# Patient Record
Sex: FEMALE | Race: WHITE | NOT HISPANIC OR LATINO | Employment: OTHER | URBAN - METROPOLITAN AREA
[De-identification: names, ages, dates, MRNs, and addresses within clinical notes are randomized per-mention and may not be internally consistent; named-entity substitution may affect disease eponyms.]

---

## 2018-05-08 ENCOUNTER — HOSPITAL ENCOUNTER (EMERGENCY)
Facility: HOSPITAL | Age: 63
Discharge: PSYCHIATRIC OR SUBSTANCE USE FACILITY | End: 2018-05-09
Attending: EMERGENCY MEDICINE
Payer: MEDICARE

## 2018-05-08 DIAGNOSIS — R06.02 SHORTNESS OF BREATH: Primary | ICD-10-CM

## 2018-05-08 LAB
ALBUMIN SERPL BCP-MCNC: 3.3 G/DL
ALLENS TEST: ABNORMAL
ALP SERPL-CCNC: 62 U/L
ALT SERPL W/O P-5'-P-CCNC: 7 U/L
ANION GAP SERPL CALC-SCNC: 6 MMOL/L
AST SERPL-CCNC: 12 U/L
BACTERIA #/AREA URNS HPF: NORMAL /HPF
BASOPHILS # BLD AUTO: 0.01 K/UL
BASOPHILS NFR BLD: 0.1 %
BILIRUB SERPL-MCNC: 0.4 MG/DL
BILIRUB UR QL STRIP: NEGATIVE
BUN SERPL-MCNC: 37 MG/DL
CALCIUM SERPL-MCNC: 9.2 MG/DL
CHLORIDE SERPL-SCNC: 110 MMOL/L
CLARITY UR: CLEAR
CO2 SERPL-SCNC: 25 MMOL/L
COLOR UR: YELLOW
CREAT SERPL-MCNC: 0.8 MG/DL
D DIMER PPP IA.FEU-MCNC: 1.96 MG/L FEU
DELSYS: ABNORMAL
DIFFERENTIAL METHOD: ABNORMAL
EOSINOPHIL # BLD AUTO: 0.3 K/UL
EOSINOPHIL NFR BLD: 3.4 %
ERYTHROCYTE [DISTWIDTH] IN BLOOD BY AUTOMATED COUNT: 15.3 %
EST. GFR  (AFRICAN AMERICAN): >60 ML/MIN/1.73 M^2
EST. GFR  (NON AFRICAN AMERICAN): >60 ML/MIN/1.73 M^2
GLUCOSE SERPL-MCNC: 76 MG/DL
GLUCOSE UR QL STRIP: NEGATIVE
HCO3 UR-SCNC: 21.9 MMOL/L (ref 24–28)
HCT VFR BLD AUTO: 33.9 %
HGB BLD-MCNC: 10.3 G/DL
HGB UR QL STRIP: NEGATIVE
KETONES UR QL STRIP: NEGATIVE
LACTATE SERPL-SCNC: 1 MMOL/L
LEUKOCYTE ESTERASE UR QL STRIP: ABNORMAL
LYMPHOCYTES # BLD AUTO: 2.5 K/UL
LYMPHOCYTES NFR BLD: 31.7 %
MCH RBC QN AUTO: 27.3 PG
MCHC RBC AUTO-ENTMCNC: 30.4 G/DL
MCV RBC AUTO: 90 FL
MICROSCOPIC COMMENT: NORMAL
MODE: ABNORMAL
MONOCYTES # BLD AUTO: 0.9 K/UL
MONOCYTES NFR BLD: 11.1 %
NEUTROPHILS # BLD AUTO: 4.3 K/UL
NEUTROPHILS NFR BLD: 53.7 %
NITRITE UR QL STRIP: NEGATIVE
PCO2 BLDA: 35.3 MMHG (ref 35–45)
PH SMN: 7.4 [PH] (ref 7.35–7.45)
PH UR STRIP: 7 [PH] (ref 5–8)
PLATELET # BLD AUTO: 185 K/UL
PMV BLD AUTO: 10.2 FL
PO2 BLDA: 71 MMHG (ref 80–100)
POC BE: -3 MMOL/L
POC SATURATED O2: 94 % (ref 95–100)
POC TCO2: 23 MMOL/L (ref 23–27)
POTASSIUM SERPL-SCNC: 4.3 MMOL/L
PROT SERPL-MCNC: 6.8 G/DL
PROT UR QL STRIP: NEGATIVE
RBC # BLD AUTO: 3.77 M/UL
RBC #/AREA URNS HPF: 1 /HPF (ref 0–4)
SAMPLE: ABNORMAL
SITE: ABNORMAL
SODIUM SERPL-SCNC: 141 MMOL/L
SP GR UR STRIP: <=1.005 (ref 1–1.03)
SQUAMOUS #/AREA URNS HPF: 2 /HPF
TROPONIN I SERPL DL<=0.01 NG/ML-MCNC: <0.006 NG/ML
TSH SERPL DL<=0.005 MIU/L-ACNC: 1.78 UIU/ML
URN SPEC COLLECT METH UR: ABNORMAL
UROBILINOGEN UR STRIP-ACNC: NEGATIVE EU/DL
WBC # BLD AUTO: 8.01 K/UL
WBC #/AREA URNS HPF: 4 /HPF (ref 0–5)

## 2018-05-08 PROCEDURE — 99285 EMERGENCY DEPT VISIT HI MDM: CPT | Mod: 25

## 2018-05-08 PROCEDURE — 85025 COMPLETE CBC W/AUTO DIFF WBC: CPT

## 2018-05-08 PROCEDURE — 84443 ASSAY THYROID STIM HORMONE: CPT

## 2018-05-08 PROCEDURE — 81000 URINALYSIS NONAUTO W/SCOPE: CPT

## 2018-05-08 PROCEDURE — 36600 WITHDRAWAL OF ARTERIAL BLOOD: CPT

## 2018-05-08 PROCEDURE — 96360 HYDRATION IV INFUSION INIT: CPT | Mod: 59

## 2018-05-08 PROCEDURE — 93005 ELECTROCARDIOGRAM TRACING: CPT

## 2018-05-08 PROCEDURE — 84484 ASSAY OF TROPONIN QUANT: CPT

## 2018-05-08 PROCEDURE — 83605 ASSAY OF LACTIC ACID: CPT

## 2018-05-08 PROCEDURE — 82803 BLOOD GASES ANY COMBINATION: CPT

## 2018-05-08 PROCEDURE — 85379 FIBRIN DEGRADATION QUANT: CPT

## 2018-05-08 PROCEDURE — 93010 ELECTROCARDIOGRAM REPORT: CPT | Mod: ,,, | Performed by: INTERNAL MEDICINE

## 2018-05-08 PROCEDURE — 80053 COMPREHEN METABOLIC PANEL: CPT

## 2018-05-08 PROCEDURE — 96361 HYDRATE IV INFUSION ADD-ON: CPT

## 2018-05-08 PROCEDURE — 25000003 PHARM REV CODE 250: Performed by: EMERGENCY MEDICINE

## 2018-05-08 RX ORDER — SULFAMETHOXAZOLE AND TRIMETHOPRIM 800; 160 MG/1; MG/1
1 TABLET ORAL
Status: ON HOLD | COMMUNITY
End: 2020-04-11 | Stop reason: HOSPADM

## 2018-05-08 RX ORDER — FERROUS SULFATE 325(65) MG
325 TABLET, DELAYED RELEASE (ENTERIC COATED) ORAL DAILY
Status: ON HOLD | COMMUNITY
End: 2020-04-11 | Stop reason: SDUPTHER

## 2018-05-08 RX ORDER — ALBUTEROL SULFATE 2.5 MG/.5ML
2.5 SOLUTION RESPIRATORY (INHALATION) 4 TIMES DAILY
COMMUNITY

## 2018-05-08 RX ORDER — ESCITALOPRAM OXALATE 20 MG/1
20 TABLET ORAL DAILY
Status: ON HOLD | COMMUNITY
End: 2020-04-11 | Stop reason: SDUPTHER

## 2018-05-08 RX ORDER — BUDESONIDE 0.5 MG/2ML
0.5 INHALANT ORAL 2 TIMES DAILY
Status: ON HOLD | COMMUNITY
End: 2020-04-11 | Stop reason: HOSPADM

## 2018-05-08 RX ORDER — TOLTERODINE 2 MG/1
4 CAPSULE, EXTENDED RELEASE ORAL DAILY
Status: ON HOLD | COMMUNITY
End: 2020-04-11 | Stop reason: HOSPADM

## 2018-05-08 RX ORDER — OXYBUTYNIN CHLORIDE 10 MG/1
10 TABLET, EXTENDED RELEASE ORAL DAILY
Status: ON HOLD | COMMUNITY
End: 2020-04-11 | Stop reason: SDUPTHER

## 2018-05-08 RX ORDER — FLUTICASONE PROPIONATE AND SALMETEROL 250; 50 UG/1; UG/1
1 POWDER RESPIRATORY (INHALATION) 2 TIMES DAILY
Status: ON HOLD | COMMUNITY
End: 2020-04-11 | Stop reason: SDUPTHER

## 2018-05-08 RX ADMIN — SODIUM CHLORIDE 1000 ML: 0.9 INJECTION, SOLUTION INTRAVENOUS at 10:05

## 2018-05-09 VITALS
OXYGEN SATURATION: 95 % | RESPIRATION RATE: 16 BRPM | HEIGHT: 61 IN | WEIGHT: 140 LBS | DIASTOLIC BLOOD PRESSURE: 85 MMHG | TEMPERATURE: 99 F | BODY MASS INDEX: 26.43 KG/M2 | SYSTOLIC BLOOD PRESSURE: 103 MMHG | HEART RATE: 64 BPM

## 2018-05-09 PROCEDURE — 25500020 PHARM REV CODE 255: Performed by: EMERGENCY MEDICINE

## 2018-05-09 RX ADMIN — IOHEXOL 100 ML: 350 INJECTION, SOLUTION INTRAVENOUS at 12:05

## 2018-05-09 NOTE — ED NOTES
Assumed care of a 62 year old female complain of Sob from local senior care facility . Alert and orient to person and place .. Placed in room 14 placed on cardiac monitor .02 at 2 l/m bnc . o2 sats 95  % ..hx od dementia .. Has a colostomy bag in LLQ abdomen with formed brown stool noted ,patient partially pulled bag off .ems taped it in place , wafer bag removed ,cleaned skin care given . New bag applied

## 2018-05-09 NOTE — ED NOTES
Patient holding on to leads ,pulled them off her chest. Iv cath intract sitting in the bed . Bed saturated . Bed linen changed Pt provided with gown . Diaper changed . returned to monitor . ,

## 2018-05-09 NOTE — ED NOTES
Pt cleared for discharge back to [facility] and charge nurse ordered transportation per SPD; pt is daipered and dry and intact; pt has mittens and was only on left hand on rounding; iv fluids almost completed

## 2018-05-09 NOTE — ED NOTES
SPD at the bedside for discharge transport. --- pt only had a gray sweater as clothing; no other valuables

## 2018-05-09 NOTE — ED PROVIDER NOTES
Encounter Date: 5/8/2018    SCRIBE #1 NOTE: I, Mayuri Morley, am scribing for, and in the presence of,  Dr. Pino Giles. I have scribed the entire note.       History     Chief Complaint   Patient presents with    Shortness of Breath     62y F to ED via [facility] from [facility]. c/o SOB, hypotensive. EMS reports O2 sats 91% on RA, BP 85/40 on arrival. 250cc NS given in route     This is a 62 y.o. female who has PMHx including COPD, CHF, UTI, dementia.  Hx is limited secondary to dementia.  The patient presents to the Emergency Department via EMS from [facility] for shortness of breath, hypotension.   EMS called for pt with shortness of breath, noted pt to be hypotensive to 85/40 on pickup, somewhat improved with IV fluids en route.  Pt's O2sat 91% on room air on arrival, up to 96% en route on 2 L NC O2 supplementation.  Symptoms are associated with productive cough.  Pt denies pain with urination.   Pt recently treated for UTI at [facility].  EMS report O2 sats at 91%      The history is provided by the EMS personnel.     Review of patient's allergies indicates:   Allergen Reactions    Ibuprofen     Penicillins      Past Medical History:   Diagnosis Date    Anemia     CHF (congestive heart failure)     COPD (chronic obstructive pulmonary disease)     Dementia     Diabetes mellitus     Hypertension     UTI (urinary tract infection)      Past Surgical History:   Procedure Laterality Date    COLOSTOMY       History reviewed. No pertinent family history.  Social History   Substance Use Topics    Smoking status: Unknown If Ever Smoked    Smokeless tobacco: Not on file    Alcohol use No     Review of Systems   Unable to perform ROS: Dementia   Respiratory: Positive for cough and shortness of breath.    Cardiovascular: Negative for chest pain.   Gastrointestinal: Negative for vomiting.   Genitourinary: Negative for difficulty urinating and dysuria.   Skin: Negative for wound.        Physical Exam     Initial Vitals [05/08/18 2135]   BP Pulse Resp Temp SpO2   (!) 122/91 99 (!) 22 98.3 °F (36.8 °C) 97 %      MAP       101.33         Physical Exam    Nursing note and vitals reviewed.  Constitutional: She appears well-developed and well-nourished. No distress.   HENT:   Head: Normocephalic and atraumatic.   Dry oropharynx.    Eyes: Conjunctivae are normal. Pupils are equal, round, and reactive to light.   Neck: Normal range of motion. Neck supple.   Cardiovascular: Normal rate, regular rhythm and normal heart sounds.   Pulmonary/Chest:   Tachypnea.  Faint wheezing bilaterally.  Positive wet cough.   Abdominal: Soft. She exhibits no distension. There is no tenderness.   Midline abdominal scar.  Ostomy bag with brown stool.   Musculoskeletal: Normal range of motion. She exhibits no edema or tenderness.   Lymphadenopathy:     She has no cervical adenopathy.   Neurological: She is alert and oriented to person, place, and time.   Scant pitting edema, bruising to the lower extremities.  Small abrasion to the left knee.  Open wound to the right leg, lateral aspect, 1x3 cm with reactive skin change/surrounding erythema.   Skin: Skin is warm and dry.         ED Course   Procedures  Labs Reviewed   CBC W/ AUTO DIFFERENTIAL - Abnormal; Notable for the following:        Result Value    RBC 3.77 (*)     Hemoglobin 10.3 (*)     Hematocrit 33.9 (*)     MCHC 30.4 (*)     RDW 15.3 (*)     All other components within normal limits   COMPREHENSIVE METABOLIC PANEL - Abnormal; Notable for the following:     BUN, Bld 37 (*)     Albumin 3.3 (*)     ALT 7 (*)     Anion Gap 6 (*)     All other components within normal limits   D DIMER, QUANTITATIVE - Abnormal; Notable for the following:     D-Dimer 1.96 (*)     All other components within normal limits   URINALYSIS - Abnormal; Notable for the following:     Specific Gravity, UA <=1.005 (*)     Leukocytes, UA 1+ (*)     All other components within normal limits    ISTAT PROCEDURE - Abnormal; Notable for the following:     POC PO2 71 (*)     POC HCO3 21.9 (*)     POC SATURATED O2 94 (*)     All other components within normal limits   LACTIC ACID, PLASMA   TROPONIN I   TSH   URINALYSIS MICROSCOPIC   B-TYPE NATRIURETIC PEPTIDE     EKG Readings: (Independently Interpreted)   EKG: sinus rhythm with PAC's at 86 bpm, nl axis, nl intervals, no hypertrophy, no ST-T changes as read by me (Dr. Giles).      Imaging Results          CTA Chest Non-Coronary (PE Study) (Final result)  Result time 05/09/18 00:36:46    Final result by Adonis Garcia MD (05/09/18 00:36:46)                 Impression:      1. No pulmonary thromboembolus up to the level of the proximal segmental pulmonary arteries.  2. CT findings consistent with pulmonary hypertension.  3. Mild upper lobe predominant centrilobular emphysema.  4. Subsegmental band like opacities within both lung apices, presumably scarring.  Consider follow-up evaluation with outpatient routine CT thorax for a more accurate characterization.  5. Sequela of previous granulomatous disease.  6. Compression fractures involving the T5, T6, T8 and T12 vertebral bodies of uncertain chronicity noting lack of previous study is available for comparison.  No significant osseous retropulsion.  7. Right thyroid nodule.  Recommend further characterization with outpatient ultrasound.      Electronically signed by: Adonis Garcia MD  Date:    05/09/2018  Time:    00:36             Narrative:    EXAMINATION:  CTA CHEST NON CORONARY    CLINICAL HISTORY:  elevated dimer, short of breath, low oxygen sat;    TECHNIQUE:  Low dose axial images, sagittal and coronal reformations were obtained from the thoracic inlet to the lung bases following the IV administration of 100 mL of Omnipaque 350.  Contrast timing was optimized to evaluate the pulmonary arteries.  MIP images were performed.    COMPARISON:  Radiograph 05/08/2018.    FINDINGS:  There is a 1.3 cm complex  right thyroid nodule with a possible enhancing component.    There is a left-sided aortic arch with 3 branch vessels.  The thoracic aorta tapers normally with mild atherosclerotic calcification.  No aneurysmal dilatation or dissection.  There is moderate atherosclerotic calcification at the origin of the left subclavian artery without hemodynamically significant stenosis.    There is marked dilatation of the main pulmonary artery.  No filling defects identified to suggest a thromboembolus up to the level of the proximal segmental arteries..    Heart is normal in size.  There is mild coronary artery atherosclerosis.  There is no associated right ventricular dilatation or hypertrophy.  No reflux of contrast into the hepatic veins.  No pericardial effusion.    There are calcified hilar lymph nodes.  No definite axillary or mediastinal lymph node enlargement.    Esophagus is normal in caliber and course.    The trachea and proximal airways are patent.  There is mild centrilobular emphysema with upper lobe predominance.  Subsegmental band like opacities are noted within both upper lobes, presumably scarring.  Calcified granuloma noted within the left lower lobe.  No focal airspace opacity.  No pneumothorax or pleural effusions.  No bronchiectasis.    Calcified splenic granulomas are identified.  Remaining visualized upper abdominal structures are unremarkable.    Visualized subcutaneous soft tissues are within normal limits.    There are compression fractures of the T5, T6, T8 and T12 vertebral bodies of uncertain chronicity degenerative changes of the spine are identified.  There is a sclerotic lesion within the posterior aspect of the left clavicle about the sternoclavicular joint, possibly a bone island but overall nonspecific.                               X-Ray Chest 1 View (Final result)  Result time 05/08/18 22:29:24    Final result by Robb Escamilla MD (05/08/18 22:29:24)                 Impression:       Stable cardiomegaly.  Chronic increased interstitial markings, similar to prior.  No acute findings.    Old left-sided rib fractures.  Old left clavicle fracture.      Electronically signed by: Robb Escamilla MD  Date:    05/08/2018  Time:    22:29             Narrative:    EXAMINATION:  XR CHEST 1 VIEW    CLINICAL HISTORY:  Shortness of breath    TECHNIQUE:  Single frontal view of the chest was performed.    COMPARISON:  August 6, 2014.    FINDINGS:  Old left-sided rib fractures.  Old left clavicle fracture.    Stable cardiomegaly.  Chronic increased interstitial markings, similar to prior.    Heart and lungs  appear unchanged when allowing for differences in technique and positioning.                                    Medical Decision Making:   Initial Assessment:   62 year old female with history of dementia, COPD, CHF presents from [facility] via EMS for shortness of breath.  On exam patient is positive for wheezing, tachypnea, wet cough.  Differential Diagnosis:   CHF, COPD, PE, PNA, pleural effusion.  Clinical Tests:   Lab Tests: Ordered and Reviewed  Radiological Study: Ordered and Reviewed  Medical Tests: Ordered and Reviewed  ED Management:  Plan for IV fluids, basic labs, UA, BNP, D-dimer, troponin, CXR, reassess.    11:40 PM  CXR reveals cardiomegaly, increased interstitial marking bilaterally, chronic stable changes, no acute pulmonary disease.    12:55 AM  Pt is resting comfortably at 100%, will take off supplemental O2 to get sats on room air. CTA is negative for acute pathology.    1:05 AM   Sats are still normal at 97-99% on room air. Pt is resting comfortably in the ER. Workup is unremarkable including CTA chest. Unclear etiology for presentation, however no indication to admit at this time. Will have the pt follow up with PCP within 2-3 days, return to the ED for any other concerns.      Clinical impression and plan discussed with patient.    Pt is to call for follow up with  PCP in 7 days.  Pt to return to the ED for any new or concerning symptoms.  Aftercare instructions and return precautions provided to patient.   Pt expressed understanding and agrees with plan.                           Clinical Impression:     1. Shortness of breath         Disposition:   Disposition: Discharged       Scribe attestation: I, Dr. Pino Giles, personally performed the services described in this documentation.   All medical record entries made by the scribe were at my direction and in my presence.   I have reviewed the chart and agree that the record is accurate and complete.   Pino Giles MD.                 Pino Giles MD  05/09/18 9545

## 2018-05-09 NOTE — ED NOTES
Pt repositioned and iv fluids in progress; rails up and bed in low and locked and has fall socks and bracelet on; pt in view of staff; left abd colostomy patent w/ brown stool

## 2018-05-09 NOTE — ED NOTES
Pt repeatedly  ask for coffee, dr Pores notified . Patient notified not at this time . Iv fluids infusing well   Pulling on cords . Wet sounding cough .pt denies SOB  Does have an audible wheeze but clears with coughing .

## 2020-04-05 ENCOUNTER — HOSPITAL ENCOUNTER (INPATIENT)
Facility: HOSPITAL | Age: 65
LOS: 5 days | DRG: 178 | End: 2020-04-11
Attending: EMERGENCY MEDICINE | Admitting: EMERGENCY MEDICINE
Payer: MEDICARE

## 2020-04-05 DIAGNOSIS — J44.9 CHRONIC OBSTRUCTIVE PULMONARY DISEASE, UNSPECIFIED COPD TYPE: ICD-10-CM

## 2020-04-05 DIAGNOSIS — R09.02 HYPOXIA: ICD-10-CM

## 2020-04-05 DIAGNOSIS — J18.9 MULTIFOCAL PNEUMONIA: ICD-10-CM

## 2020-04-05 DIAGNOSIS — R06.02 SHORTNESS OF BREATH: ICD-10-CM

## 2020-04-05 DIAGNOSIS — U07.1 COVID-19: Primary | ICD-10-CM

## 2020-04-05 LAB
ALLENS TEST: ABNORMAL
BACTERIA #/AREA URNS HPF: ABNORMAL /HPF
BASOPHILS # BLD AUTO: 0.01 K/UL (ref 0–0.2)
BASOPHILS NFR BLD: 0.2 % (ref 0–1.9)
BILIRUB UR QL STRIP: NEGATIVE
CLARITY UR: ABNORMAL
COLOR UR: ABNORMAL
DELSYS: ABNORMAL
DIFFERENTIAL METHOD: ABNORMAL
EOSINOPHIL # BLD AUTO: 0 K/UL (ref 0–0.5)
EOSINOPHIL NFR BLD: 0.2 % (ref 0–8)
ERYTHROCYTE [DISTWIDTH] IN BLOOD BY AUTOMATED COUNT: 14.4 % (ref 11.5–14.5)
ERYTHROCYTE [SEDIMENTATION RATE] IN BLOOD BY WESTERGREN METHOD: 16 MM/H
FLOW: 4
GLUCOSE UR QL STRIP: NEGATIVE
HCO3 UR-SCNC: 27.9 MMOL/L (ref 24–28)
HCT VFR BLD AUTO: 39.5 % (ref 37–48.5)
HGB BLD-MCNC: 11.9 G/DL (ref 12–16)
HGB UR QL STRIP: ABNORMAL
HYALINE CASTS #/AREA URNS LPF: 0 /LPF
IMM GRANULOCYTES # BLD AUTO: 0.01 K/UL (ref 0–0.04)
IMM GRANULOCYTES NFR BLD AUTO: 0.2 % (ref 0–0.5)
KETONES UR QL STRIP: NEGATIVE
LEUKOCYTE ESTERASE UR QL STRIP: ABNORMAL
LYMPHOCYTES # BLD AUTO: 1.5 K/UL (ref 1–4.8)
LYMPHOCYTES NFR BLD: 36.3 % (ref 18–48)
MCH RBC QN AUTO: 26.4 PG (ref 27–31)
MCHC RBC AUTO-ENTMCNC: 30.1 G/DL (ref 32–36)
MCV RBC AUTO: 88 FL (ref 82–98)
MICROSCOPIC COMMENT: ABNORMAL
MODE: ABNORMAL
MONOCYTES # BLD AUTO: 0.4 K/UL (ref 0.3–1)
MONOCYTES NFR BLD: 10.4 % (ref 4–15)
NEUTROPHILS # BLD AUTO: 2.2 K/UL (ref 1.8–7.7)
NEUTROPHILS NFR BLD: 52.7 % (ref 38–73)
NITRITE UR QL STRIP: NEGATIVE
NRBC BLD-RTO: 0 /100 WBC
PCO2 BLDA: 51 MMHG (ref 35–45)
PH SMN: 7.35 [PH] (ref 7.35–7.45)
PH UR STRIP: 6 [PH] (ref 5–8)
PLATELET # BLD AUTO: 143 K/UL (ref 150–350)
PMV BLD AUTO: 10.9 FL (ref 9.2–12.9)
PO2 BLDA: 36 MMHG (ref 40–60)
POC BE: 1 MMOL/L
POC SATURATED O2: 65 % (ref 95–100)
POC TCO2: 29 MMOL/L (ref 24–29)
PROT UR QL STRIP: ABNORMAL
RBC # BLD AUTO: 4.51 M/UL (ref 4–5.4)
RBC #/AREA URNS HPF: 15 /HPF (ref 0–4)
SAMPLE: ABNORMAL
SARS-COV-2 RNA AMPLIFICATION, QUAL: NEGATIVE
SITE: ABNORMAL
SP GR UR STRIP: 1.02 (ref 1–1.03)
SP02: 91
URN SPEC COLLECT METH UR: ABNORMAL
UROBILINOGEN UR STRIP-ACNC: NEGATIVE EU/DL
WBC # BLD AUTO: 4.24 K/UL (ref 3.9–12.7)
WBC #/AREA URNS HPF: >100 /HPF (ref 0–5)

## 2020-04-05 PROCEDURE — 84484 ASSAY OF TROPONIN QUANT: CPT

## 2020-04-05 PROCEDURE — 93010 EKG 12-LEAD: ICD-10-PCS | Mod: ,,, | Performed by: INTERNAL MEDICINE

## 2020-04-05 PROCEDURE — 82803 BLOOD GASES ANY COMBINATION: CPT

## 2020-04-05 PROCEDURE — 93010 ELECTROCARDIOGRAM REPORT: CPT | Mod: ,,, | Performed by: INTERNAL MEDICINE

## 2020-04-05 PROCEDURE — 83880 ASSAY OF NATRIURETIC PEPTIDE: CPT

## 2020-04-05 PROCEDURE — 87040 BLOOD CULTURE FOR BACTERIA: CPT

## 2020-04-05 PROCEDURE — 83605 ASSAY OF LACTIC ACID: CPT

## 2020-04-05 PROCEDURE — 99900035 HC TECH TIME PER 15 MIN (STAT)

## 2020-04-05 PROCEDURE — 99285 EMERGENCY DEPT VISIT HI MDM: CPT | Mod: 25

## 2020-04-05 PROCEDURE — 84145 PROCALCITONIN (PCT): CPT

## 2020-04-05 PROCEDURE — 51702 INSERT TEMP BLADDER CATH: CPT

## 2020-04-05 PROCEDURE — 87088 URINE BACTERIA CULTURE: CPT

## 2020-04-05 PROCEDURE — 87077 CULTURE AEROBIC IDENTIFY: CPT | Mod: 59

## 2020-04-05 PROCEDURE — U0002 COVID-19 LAB TEST NON-CDC: HCPCS

## 2020-04-05 PROCEDURE — 93005 ELECTROCARDIOGRAM TRACING: CPT

## 2020-04-05 PROCEDURE — 87186 SC STD MICRODIL/AGAR DIL: CPT | Mod: 59

## 2020-04-05 PROCEDURE — 85025 COMPLETE CBC W/AUTO DIFF WBC: CPT

## 2020-04-05 PROCEDURE — 86140 C-REACTIVE PROTEIN: CPT

## 2020-04-05 PROCEDURE — 87086 URINE CULTURE/COLONY COUNT: CPT

## 2020-04-05 PROCEDURE — 81000 URINALYSIS NONAUTO W/SCOPE: CPT

## 2020-04-05 PROCEDURE — 80053 COMPREHEN METABOLIC PANEL: CPT

## 2020-04-05 PROCEDURE — 63700000 PHARM REV CODE 250 ALT 637 W/O HCPCS: Performed by: EMERGENCY MEDICINE

## 2020-04-05 RX ORDER — HYDROXYCHLOROQUINE SULFATE 200 MG/1
400 TABLET, FILM COATED ORAL DAILY
Status: DISCONTINUED | OUTPATIENT
Start: 2020-04-07 | End: 2020-04-06

## 2020-04-05 RX ORDER — HYDROXYCHLOROQUINE SULFATE 200 MG/1
400 TABLET, FILM COATED ORAL 2 TIMES DAILY
Status: DISCONTINUED | OUTPATIENT
Start: 2020-04-06 | End: 2020-04-06

## 2020-04-05 RX ORDER — AZITHROMYCIN 250 MG/1
500 TABLET, FILM COATED ORAL
Status: COMPLETED | OUTPATIENT
Start: 2020-04-05 | End: 2020-04-05

## 2020-04-05 RX ADMIN — AZITHROMYCIN 500 MG: 250 TABLET, FILM COATED ORAL at 11:04

## 2020-04-05 NOTE — ED PROVIDER NOTES
Encounter Date: 4/5/2020    SCRIBE #1 NOTE: I, Catarino Tillman, am scribing for, and in the presence of,  Milton Canela MD. I have scribed the following portions of the note - Other sections scribed: HPI, ROS.       History     Chief Complaint   Patient presents with    Cough     EMS reports pt from [facility] today for cough and fever.      This is a 64 y.o. female who is referred to the ED from [facility] today for evaluation of cough and fever. Pt is with hx of dementia and very confused at the time of examination. Poor historian.    The history is provided by the patient and medical records. The history is limited by the condition of the patient (altered mental status).     Review of patient's allergies indicates:   Allergen Reactions    Ibuprofen     Penicillins      Past Medical History:   Diagnosis Date    Anemia     CHF (congestive heart failure)     COPD (chronic obstructive pulmonary disease)     Dementia     Diabetes mellitus     Hypertension     UTI (urinary tract infection)      Past Surgical History:   Procedure Laterality Date    COLOSTOMY       History reviewed. No pertinent family history.  Social History     Tobacco Use    Smoking status: Unknown If Ever Smoked   Substance Use Topics    Alcohol use: No    Drug use: No     Review of Systems   Unable to perform ROS: Dementia   Constitutional: Positive for fever.   Respiratory: Positive for cough.    Psychiatric/Behavioral: Positive for confusion.       Physical Exam     Initial Vitals [04/05/20 1832]   BP Pulse Resp Temp SpO2   106/69 78 20 99.2 °F (37.3 °C) (!) 89 %      MAP       --         Physical Exam    Nursing note and vitals reviewed.  Constitutional: She appears well-developed and well-nourished. She is not diaphoretic. No distress.   HENT:   Head: Normocephalic and atraumatic.   Nose: Nose normal.   Mouth/Throat: Oropharynx is clear and moist.   Eyes: Conjunctivae and EOM are normal.  Pupils are equal, round, and reactive to light. Right eye exhibits no discharge. Left eye exhibits no discharge.   Neck: Normal range of motion. Neck supple. No thyromegaly present.   Cardiovascular: Normal rate, regular rhythm, normal heart sounds and intact distal pulses. Exam reveals no gallop and no friction rub.    No murmur heard.  Pulmonary/Chest: No stridor. No respiratory distress. She has wheezes (audible). She has no rhonchi. She has no rales. She exhibits no tenderness.   Abdominal: She exhibits no distension. There is no rebound and no guarding.   Pt has a colostomy with formed stool. No bag present. Currently covered with sheet.   There is a well healed surgical midline scar.   Musculoskeletal: Normal range of motion. She exhibits no edema or tenderness.   Neurological: She is alert and oriented to person, place, and time. She has normal strength. No cranial nerve deficit.   Skin: Skin is warm and dry. No rash noted. No erythema.   Psychiatric: She has a normal mood and affect. Her behavior is normal. Judgment and thought content normal.         ED Course   Procedures  Labs Reviewed   CBC W/ AUTO DIFFERENTIAL - Abnormal; Notable for the following components:       Result Value    Hemoglobin 11.9 (*)     Mean Corpuscular Hemoglobin 26.4 (*)     Mean Corpuscular Hemoglobin Conc 30.1 (*)     Platelets 143 (*)     All other components within normal limits   COMPREHENSIVE METABOLIC PANEL - Abnormal; Notable for the following components:    Calcium 8.3 (*)     Albumin 3.0 (*)     All other components within normal limits    Narrative:     Recoll. [id number] by LM1 at 04/05/2020 21:18, reason:   HEMOLYZED/DISCARDED/MORGAN   C-REACTIVE PROTEIN - Abnormal; Notable for the following components:    CRP 34.3 (*)     All other components within normal limits    Narrative:     Recoll. [id number] by LM1 at 04/05/2020 21:18, reason:   HEMOLYZED/DISCARDED/MORGAN   URINALYSIS, REFLEX TO URINE CULTURE - Abnormal;  Notable for the following components:    Appearance, UA Cloudy (*)     Protein, UA 2+ (*)     Occult Blood UA 1+ (*)     Leukocytes, UA 2+ (*)     All other components within normal limits    Narrative:     Preferred Collection Type->Urine, Clean Catch   URINALYSIS MICROSCOPIC - Abnormal; Notable for the following components:    RBC, UA 15 (*)     WBC, UA >100 (*)     Bacteria Moderate (*)     All other components within normal limits    Narrative:     Preferred Collection Type->Urine, Clean Catch   ISTAT PROCEDURE - Abnormal; Notable for the following components:    POC PH 7.346 (*)     POC PCO2 51.0 (*)     POC PO2 36 (*)     POC SATURATED O2 65 (*)     All other components within normal limits   CULTURE, BLOOD   CULTURE, BLOOD   CULTURE, URINE   TROPONIN I    Narrative:     Recoll. [id number] by [facility] at 04/05/2020 21:18, reason:   HEMOLYZED/DISCARDED/MORGAN   B-TYPE NATRIURETIC PEPTIDE    Narrative:     Recoll. [id number] by 1 at 04/05/2020 21:18, reason:   HEMOLYZED/DISCARDED/MORGAN   PROCALCITONIN    Narrative:     Recoll. [id number] by [facility] at 04/05/2020 21:18, reason:   HEMOLYZED/DISCARDED/MORGAN   SARS-COV-2 RNA AMPLIFICATION, QUAL   LACTIC ACID, PLASMA    Narrative:     Recoll. [id number] by [facility] at 04/05/2020 21:18, reason:   HEMOLYZED/DISCARDED/MORGAN          Imaging Results          CT Chest Without Contrast (Final result)  Result time 04/06/20 00:48:58    Final result by Jay Diaz MD (04/06/20 00:48:58)                 Impression:      The lungs demonstrate chronic and atelectatic change mild infiltrates are noted in there is peribronchial thickening noted as discussed above.    The main pulmonary artery appears enlarged, correlation for pulmonary hypertension is needed.    Staghorn calculus of the left kidney with appearance of mild perinephric and periureteral stranding without obstructive uropathy which clinical and historical correlation is needed.    Hypodense thyroid lesion on the  right.      Electronically signed by: Jay Diaz  Date:    04/06/2020  Time:    00:48             Narrative:    EXAMINATION:  CT CHEST WITHOUT CONTRAST    CLINICAL HISTORY:  Cough, persistent, xray nondiagnostic;    TECHNIQUE:  Low dose axial images, sagittal and coronal reformations were obtained from the thoracic inlet to the lung bases. Contrast was not administered.    COMPARISON:  May 9, 2018    FINDINGS:  CT examination of the chest was performed.  Intravenous contrast was not utilized, this diminishes the sensitivity of the exam.  Axial imaging, sagittal and coronal reconstruction imaging is submitted.    The lungs demonstrate motion artifact, atelectatic changes are noted.  There is mild chronic appearing change with areas of mild scarring noted.  There are findings of the right lower lobe likely relating to atelectatic change and potentially some degree of scarring.  Mild atelectatic changes suspected scarring of the left upper lobe and lingular segment with mild peripheral infiltrate at the lingular segment suspected.  Mild atelectasis and basilar infiltrate at the left lung base noted, there is bilateral peribronchial thickening noted.  There is no evidence for significant pleural effusion and there is no evidence for pneumothorax.    There is a hypodense lesion of the right thyroid lobe measuring approximately 11 mm on axial imaging.  The main pulmonary artery appears enlarged measuring up to approximately 5.1 cm is can be seen with pulmonary hypertension for which clinical and historical correlation is needed, heart and great vessels are not well evaluated on this noncontrast examination.  Evaluation for adenopathy is limited.  The aorta and coronary arterial vascular structures demonstrate atherosclerotic change.  Mild pericardial thickening is noted.  There is no evidence for pneumothorax.    Limited imaging of the upper abdomen demonstrates evidence for prior cholecystectomy.  There is a  suspected small exophytic cyst of the left kidney axial image 87 measuring approximately 10 Hounsfield units.  This is not optimally evaluated on this exam.  Staghorn calculus of the left kidney noted, there is some mild perinephric and periureteral stranding without obstructive uropathy appreciated on limited imaging.  The osseous structures demonstrate chronic change.                               X-Ray Chest AP Portable (Final result)  Result time 04/05/20 19:42:10    Final result by Joseline Enrique MD (04/05/20 19:42:10)                 Impression:      No focal consolidation.  Stable cardiomegaly.      Electronically signed by: Joseline Enrique  Date:    04/05/2020  Time:    19:42             Narrative:    EXAMINATION:  AP PORTABLE CHEST    CLINICAL HISTORY:  CHF;    TECHNIQUE:  AP portable chest radiograph was submitted.    COMPARISON:  05/08/2018    FINDINGS:  AP portable chest radiograph demonstrates heart enlargement of the cardiac silhouette.  There is persistent bulging at the AP window, which may indicate pulmonary arterial hypertension.  There is no focal consolidation, pneumothorax, or pleural effusion.  A calcified granuloma is seen at the left lung apex.  There are nonacute fractures of the left posterolateral ribs.                                 Medical Decision Making:   History:   Old Medical Records: I decided to obtain old medical records.  Initial Assessment:     64-year-old female with history of dementia, CHF, COPD presenting with cough, hypoxia, wheezing from a nursing home.   Afebrile on arrival.   Vitals essentially normal.   Lungs with noted wheezing.   Patient with ostomy and stool, no colostomy bag in place.   Mild irritation of skin on buttocks, no definite breakdown.   Patient  Appears confused and repetitively asking for coffee.   No significant tachypnea or respiratory distress noted.   COVID test negative, however patient with reported fever and cough, hypoxia.  Her roommate  who is also in the emergency department is COVID positive.   Urine is grossly infected.   Lactic acid and procalcitonin negative.   Possible causes of of symptoms include COVID 19 with false negative test, pneumonia, UTI.   Patient will be treated with ceftriaxone, azithromycin, and hydroxychloroquine given hypoxia.   She will be admitted to  Hospital [facility] for further evaluation and treatment.  ED Management:  11:39 PM Spoke with Dr. Magaña.            Scribe Attestation:   Scribe #1: I performed the above scribed service and the documentation accurately describes the services I performed. I attest to the accuracy of the note.                          Clinical Impression:       ICD-10-CM ICD-9-CM   1. COVID-19 U07.1     J98.8    2. Shortness of breath R06.02 786.05   3. Multifocal pneumonia J18.9 486   4. Hypoxia R09.02 799.02         Disposition:   Disposition: Admitted  Condition: Stable     ED Disposition Condition    Admit            I, Milton Canela, personally performed the services described in this documentation. All medical record entries made by the scribe were at my direction and in my presence.  I have reviewed the chart and agree that the record reflects my personal performance and is accurate and complete                    Milton Canela MD  04/06/20 0354

## 2020-04-06 PROBLEM — N39.0 UTI (URINARY TRACT INFECTION): Status: ACTIVE | Noted: 2020-04-06

## 2020-04-06 PROBLEM — E11.9 DM2 (DIABETES MELLITUS, TYPE 2): Status: ACTIVE | Noted: 2020-04-06

## 2020-04-06 PROBLEM — I50.9 CHF (CONGESTIVE HEART FAILURE): Status: ACTIVE | Noted: 2020-04-06

## 2020-04-06 PROBLEM — I10 HTN (HYPERTENSION): Status: ACTIVE | Noted: 2020-04-06

## 2020-04-06 PROBLEM — U07.1 COVID-19: Status: ACTIVE | Noted: 2020-04-06

## 2020-04-06 PROBLEM — R93.89 ABNORMAL CT SCAN: Status: ACTIVE | Noted: 2020-04-06

## 2020-04-06 PROBLEM — F03.90 DEMENTIA: Status: ACTIVE | Noted: 2020-04-06

## 2020-04-06 PROBLEM — J44.9 COPD (CHRONIC OBSTRUCTIVE PULMONARY DISEASE): Status: ACTIVE | Noted: 2020-04-06

## 2020-04-06 LAB
ALBUMIN SERPL BCP-MCNC: 3 G/DL (ref 3.5–5.2)
ALP SERPL-CCNC: 67 U/L (ref 55–135)
ALT SERPL W/O P-5'-P-CCNC: 13 U/L (ref 10–44)
ANION GAP SERPL CALC-SCNC: 10 MMOL/L (ref 8–16)
ANION GAP SERPL CALC-SCNC: 9 MMOL/L (ref 8–16)
AST SERPL-CCNC: 23 U/L (ref 10–40)
BASOPHILS # BLD AUTO: 0 K/UL (ref 0–0.2)
BASOPHILS NFR BLD: 0 % (ref 0–1.9)
BILIRUB SERPL-MCNC: 0.3 MG/DL (ref 0.1–1)
BNP SERPL-MCNC: 31 PG/ML (ref 0–99)
BUN SERPL-MCNC: 21 MG/DL (ref 8–23)
BUN SERPL-MCNC: 22 MG/DL (ref 8–23)
CALCIUM SERPL-MCNC: 8.3 MG/DL (ref 8.7–10.5)
CALCIUM SERPL-MCNC: 8.3 MG/DL (ref 8.7–10.5)
CHLORIDE SERPL-SCNC: 103 MMOL/L (ref 95–110)
CHLORIDE SERPL-SCNC: 104 MMOL/L (ref 95–110)
CO2 SERPL-SCNC: 27 MMOL/L (ref 23–29)
CO2 SERPL-SCNC: 27 MMOL/L (ref 23–29)
CREAT SERPL-MCNC: 0.8 MG/DL (ref 0.5–1.4)
CREAT SERPL-MCNC: 0.8 MG/DL (ref 0.5–1.4)
CRP SERPL-MCNC: 34.3 MG/L (ref 0–8.2)
DIFFERENTIAL METHOD: ABNORMAL
EOSINOPHIL # BLD AUTO: 0 K/UL (ref 0–0.5)
EOSINOPHIL NFR BLD: 0.2 % (ref 0–8)
ERYTHROCYTE [DISTWIDTH] IN BLOOD BY AUTOMATED COUNT: 14.3 % (ref 11.5–14.5)
EST. GFR  (AFRICAN AMERICAN): >60 ML/MIN/1.73 M^2
EST. GFR  (AFRICAN AMERICAN): >60 ML/MIN/1.73 M^2
EST. GFR  (NON AFRICAN AMERICAN): >60 ML/MIN/1.73 M^2
EST. GFR  (NON AFRICAN AMERICAN): >60 ML/MIN/1.73 M^2
ESTIMATED AVG GLUCOSE: 123 MG/DL (ref 68–131)
GLUCOSE SERPL-MCNC: 70 MG/DL (ref 70–110)
GLUCOSE SERPL-MCNC: 86 MG/DL (ref 70–110)
HBA1C MFR BLD HPLC: 5.9 % (ref 4–5.6)
HCT VFR BLD AUTO: 40.6 % (ref 37–48.5)
HGB BLD-MCNC: 12.1 G/DL (ref 12–16)
IMM GRANULOCYTES # BLD AUTO: 0.02 K/UL (ref 0–0.04)
IMM GRANULOCYTES NFR BLD AUTO: 0.5 % (ref 0–0.5)
LACTATE SERPL-SCNC: 0.9 MMOL/L (ref 0.5–2.2)
LYMPHOCYTES # BLD AUTO: 1.8 K/UL (ref 1–4.8)
LYMPHOCYTES NFR BLD: 41 % (ref 18–48)
MCH RBC QN AUTO: 26.4 PG (ref 27–31)
MCHC RBC AUTO-ENTMCNC: 29.8 G/DL (ref 32–36)
MCV RBC AUTO: 89 FL (ref 82–98)
MONOCYTES # BLD AUTO: 0.5 K/UL (ref 0.3–1)
MONOCYTES NFR BLD: 10.4 % (ref 4–15)
NEUTROPHILS # BLD AUTO: 2.1 K/UL (ref 1.8–7.7)
NEUTROPHILS NFR BLD: 47.9 % (ref 38–73)
NRBC BLD-RTO: 0 /100 WBC
PLATELET # BLD AUTO: 139 K/UL (ref 150–350)
PMV BLD AUTO: 10 FL (ref 9.2–12.9)
POTASSIUM SERPL-SCNC: 4.1 MMOL/L (ref 3.5–5.1)
POTASSIUM SERPL-SCNC: 4.4 MMOL/L (ref 3.5–5.1)
PROCALCITONIN SERPL IA-MCNC: 0.03 NG/ML
PROT SERPL-MCNC: 7.3 G/DL (ref 6–8.4)
RBC # BLD AUTO: 4.59 M/UL (ref 4–5.4)
SODIUM SERPL-SCNC: 139 MMOL/L (ref 136–145)
SODIUM SERPL-SCNC: 141 MMOL/L (ref 136–145)
TROPONIN I SERPL DL<=0.01 NG/ML-MCNC: <0.006 NG/ML (ref 0–0.03)
TSH SERPL DL<=0.005 MIU/L-ACNC: 1.1 UIU/ML (ref 0.4–4)
WBC # BLD AUTO: 4.44 K/UL (ref 3.9–12.7)

## 2020-04-06 PROCEDURE — 63600175 PHARM REV CODE 636 W HCPCS: Performed by: FAMILY MEDICINE

## 2020-04-06 PROCEDURE — 25000003 PHARM REV CODE 250: Performed by: FAMILY MEDICINE

## 2020-04-06 PROCEDURE — 99900035 HC TECH TIME PER 15 MIN (STAT)

## 2020-04-06 PROCEDURE — 85025 COMPLETE CBC W/AUTO DIFF WBC: CPT

## 2020-04-06 PROCEDURE — 63600175 PHARM REV CODE 636 W HCPCS: Performed by: EMERGENCY MEDICINE

## 2020-04-06 PROCEDURE — 63700000 PHARM REV CODE 250 ALT 637 W/O HCPCS: Performed by: FAMILY MEDICINE

## 2020-04-06 PROCEDURE — 36415 COLL VENOUS BLD VENIPUNCTURE: CPT

## 2020-04-06 PROCEDURE — 94640 AIRWAY INHALATION TREATMENT: CPT

## 2020-04-06 PROCEDURE — 25000242 PHARM REV CODE 250 ALT 637 W/ HCPCS: Performed by: FAMILY MEDICINE

## 2020-04-06 PROCEDURE — 80048 BASIC METABOLIC PNL TOTAL CA: CPT

## 2020-04-06 PROCEDURE — 27000221 HC OXYGEN, UP TO 24 HOURS

## 2020-04-06 PROCEDURE — 83036 HEMOGLOBIN GLYCOSYLATED A1C: CPT

## 2020-04-06 PROCEDURE — 84443 ASSAY THYROID STIM HORMONE: CPT

## 2020-04-06 PROCEDURE — 21400001 HC TELEMETRY ROOM

## 2020-04-06 RX ORDER — FAMOTIDINE 20 MG/1
20 TABLET, FILM COATED ORAL 2 TIMES DAILY
Status: DISCONTINUED | OUTPATIENT
Start: 2020-04-06 | End: 2020-04-11 | Stop reason: HOSPADM

## 2020-04-06 RX ORDER — ALBUTEROL SULFATE 90 UG/1
2 AEROSOL, METERED RESPIRATORY (INHALATION) EVERY 6 HOURS PRN
Status: DISCONTINUED | OUTPATIENT
Start: 2020-04-06 | End: 2020-04-11 | Stop reason: HOSPADM

## 2020-04-06 RX ORDER — AZITHROMYCIN 250 MG/1
500 TABLET, FILM COATED ORAL DAILY
Status: DISCONTINUED | OUTPATIENT
Start: 2020-04-06 | End: 2020-04-06

## 2020-04-06 RX ORDER — OXYCODONE HYDROCHLORIDE 5 MG/1
5 TABLET ORAL EVERY 4 HOURS PRN
Status: DISCONTINUED | OUTPATIENT
Start: 2020-04-06 | End: 2020-04-06

## 2020-04-06 RX ORDER — ACETAMINOPHEN 325 MG/1
650 TABLET ORAL EVERY 4 HOURS PRN
Status: DISCONTINUED | OUTPATIENT
Start: 2020-04-06 | End: 2020-04-11 | Stop reason: HOSPADM

## 2020-04-06 RX ORDER — TIOTROPIUM BROMIDE 18 UG/1
1 CAPSULE ORAL; RESPIRATORY (INHALATION) DAILY
Status: DISCONTINUED | OUTPATIENT
Start: 2020-04-06 | End: 2020-04-11 | Stop reason: HOSPADM

## 2020-04-06 RX ORDER — AMOXICILLIN 250 MG
1 CAPSULE ORAL 2 TIMES DAILY
Status: DISCONTINUED | OUTPATIENT
Start: 2020-04-06 | End: 2020-04-11 | Stop reason: HOSPADM

## 2020-04-06 RX ORDER — HYDRALAZINE HYDROCHLORIDE 20 MG/ML
10 INJECTION INTRAMUSCULAR; INTRAVENOUS EVERY 6 HOURS PRN
Status: DISCONTINUED | OUTPATIENT
Start: 2020-04-06 | End: 2020-04-11 | Stop reason: HOSPADM

## 2020-04-06 RX ORDER — SODIUM CHLORIDE 0.9 % (FLUSH) 0.9 %
10 SYRINGE (ML) INJECTION
Status: DISCONTINUED | OUTPATIENT
Start: 2020-04-06 | End: 2020-04-11 | Stop reason: HOSPADM

## 2020-04-06 RX ORDER — ONDANSETRON 2 MG/ML
4 INJECTION INTRAMUSCULAR; INTRAVENOUS EVERY 8 HOURS PRN
Status: DISCONTINUED | OUTPATIENT
Start: 2020-04-06 | End: 2020-04-06

## 2020-04-06 RX ORDER — ACETAMINOPHEN 325 MG/1
650 TABLET ORAL EVERY 8 HOURS PRN
Status: DISCONTINUED | OUTPATIENT
Start: 2020-04-06 | End: 2020-04-06

## 2020-04-06 RX ORDER — ONDANSETRON 2 MG/ML
4 INJECTION INTRAMUSCULAR; INTRAVENOUS EVERY 8 HOURS PRN
Status: DISCONTINUED | OUTPATIENT
Start: 2020-04-06 | End: 2020-04-11 | Stop reason: HOSPADM

## 2020-04-06 RX ORDER — ENOXAPARIN SODIUM 100 MG/ML
40 INJECTION SUBCUTANEOUS EVERY 24 HOURS
Status: DISCONTINUED | OUTPATIENT
Start: 2020-04-06 | End: 2020-04-11 | Stop reason: HOSPADM

## 2020-04-06 RX ADMIN — AZITHROMYCIN MONOHYDRATE 500 MG: 250 TABLET ORAL at 08:04

## 2020-04-06 RX ADMIN — TIOTROPIUM BROMIDE 18 MCG: 18 CAPSULE ORAL; RESPIRATORY (INHALATION) at 08:04

## 2020-04-06 RX ADMIN — ENOXAPARIN SODIUM 40 MG: 100 INJECTION SUBCUTANEOUS at 09:04

## 2020-04-06 RX ADMIN — FAMOTIDINE 20 MG: 20 TABLET ORAL at 09:04

## 2020-04-06 RX ADMIN — FAMOTIDINE 20 MG: 20 TABLET ORAL at 08:04

## 2020-04-06 RX ADMIN — CEFTRIAXONE 1 G: 1 INJECTION, SOLUTION INTRAVENOUS at 12:04

## 2020-04-06 RX ADMIN — SENNOSIDES AND DOCUSATE SODIUM 1 TABLET: 8.6; 5 TABLET ORAL at 08:04

## 2020-04-06 RX ADMIN — HYDROXYCHLOROQUINE SULFATE 400 MG: 200 TABLET, FILM COATED ORAL at 08:04

## 2020-04-06 NOTE — ED TRIAGE NOTES
Called patient and notified her of provider message. She verbalized understanding.     Ania Robertson RN  [facility]   Pt presents to ED from nursing home facility . Wet productive cough noted with bilateral wheezing noted. Pt on 4 L / NC and sats remain between 95%-98%. When pt removes nasal cannula saturations drop to 80% or lower. Unable to maintain room air sats. Pt confused , unable to obtain medical history . Pt has stoma with no colostomy bag. Feces on abdomen , surrounding stoma and on bilateral hands .

## 2020-04-06 NOTE — PLAN OF CARE
[place] talked with YANETH Mcwilliams at [facility] [phone]. Feeds self and upe in w/c at NH. Room mate tested Covid positive.     04/06/20 1345   Discharge Assessment   Assessment Type Discharge Planning Assessment   Confirmed/corrected address and phone number on facesheet? Yes   Assessment information obtained from? Caregiver   Communicated expected length of stay with patient/caregiver no   Prior to hospitilization cognitive status: Alert/Oriented   Prior to hospitalization functional status: Assistive Equipment;Needs Assistance   Current cognitive status: Alert/Oriented   Current Functional Status: Assistive Equipment;Needs Assistance   Lives With facility resident   Able to Return to Prior Arrangements yes   Is patient able to care for self after discharge? No   Who are your caregiver(s) and their phone number(s)?   (son-Brad Hernández and YANETH Schmitz at [facility] [phone])   Patient's perception of discharge disposition admitted as an inpatient   Readmission Within the Last 30 Days no previous admission in last 30 days   Patient currently being followed by outpatient case management? No   Patient currently receives any other outside agency services? No   Equipment Currently Used at Home other (see comments)  (dme at NH)   Do you have any problems affording any of your prescribed medications? No   Is the patient taking medications as prescribed? yes   Does the patient have transportation home? Yes   Transportation Anticipated agency   Does the patient receive services at the [facility]? No   Discharge Plan A Return to nursing home   Discharge Plan B Return to Nursing Home   DME Needed Upon Discharge  other (see comments)   Patient/Family in Agreement with Plan yes   ..Shira Pinto, RN, BSN, STN CCM  4/6/2020

## 2020-04-06 NOTE — ED NOTES
Pt cleaned and placed in new gown. Colostomy bag applied to stoma. Pt arrived to facility with no ostomy bag and with feces surrounding abdomen, stoma and on both hands. ERP notified about urine output. Foul Odor with puss noted in brief from PTA . Physician aware 18g indwelling byrd inserted using sterile technique at this time.

## 2020-04-06 NOTE — HPI
Pt is a 65 yo NH resident with dementia who presents for cough and fever.  Pt's roommate is in the ER too with milder symptoms.  In the ER pt was noted to have hypoxia and a UTI.  Pt had COVID testing done which was positive.  Pt unable to contriubte much to history.  In the ER pt was started on plaquenil and azithromycin for the COVID respiratory disease, and ceftriaxone for the UTI.

## 2020-04-06 NOTE — NURSING TRANSFER
Nursing Transfer Note      4/6/2020     Transfer From: ED To: 328B    Transfer via stretcher    Transfer with cardiac monitoring    Transported by transport personnel    Medicines sent: none    Chart send with patient: Yes    Notified: nursing    Patient reassessed at: 0350    Upon arrival to floor patient pulled from stretcher to bed with one assist. Cardiac monitoring applied. Vital signs obtained and can be found in flow sheets with complete patient assessment. Skin dry with scabs and scars to arms and hands with a 22g RFA, LLQ colostomy, and a urethral catheter in place. No complaints of pain or signs of respiratory distress noted. Plan to continue to provide supplemental O2, continue IV antibiotics for UTI treatment, and continue COVID-19 treatment. Patient stable and will continue to be monitored.

## 2020-04-06 NOTE — CARE UPDATE
Patient seen and examined.  Agree with Dr. Magaña's treatment and plan.  Initially admitted with concern of COVID 19 infection.  Slightly hypoxic on presentation.  COVID 19 negative.  Minimal oxygen demands.  Wean oxygen as possible.  Does have UTI.  Continue Rocephin.  Kidney stone on CT, but no evidence of obstructive uropathy.  Await cultures.  Back to NH soon.

## 2020-04-06 NOTE — ASSESSMENT & PLAN NOTE
- COVID-19 testing   - Infection Control notified     - Isolation:   - Airborne, Contact and Droplet Precautions  - Cohort patients into COVID units  - N95 masks must be fit tested, wear eye protection  - 20 second hand hygiene              - Limit visitors per hospital policy              - Consolidating lab draws, nursing care, provider visits, and interventions    - Diagnostics: (leukopenia, hyponatremia, hyperferritinemia, elevated troponin, elevated d-dimer, age, and comorbidities are significant predictors of poor clinical outcome)  CBC, CMP, Procalcitonin, Ferritin, LDH and Portable CXR    - Management:  Supplemental O2 to maintain SpO2 >92%  Continuous/intermittent Pulse Ox  Albuterol INHALER PRN (avoid nebulization of secretions)  Avoiding BiPAP to prevent aerosolization (including home BiPAP)   Azithromycin + plaquenil

## 2020-04-06 NOTE — SUBJECTIVE & OBJECTIVE
Past Medical History:   Diagnosis Date    Anemia     CHF (congestive heart failure)     COPD (chronic obstructive pulmonary disease)     Dementia     Diabetes mellitus     Hypertension     UTI (urinary tract infection)        Past Surgical History:   Procedure Laterality Date    COLOSTOMY         Review of patient's allergies indicates:   Allergen Reactions    Ibuprofen     Penicillins        No current facility-administered medications on file prior to encounter.      Current Outpatient Medications on File Prior to Encounter   Medication Sig    albuterol sulfate 2.5 mg/0.5 mL Nebu Take 2.5 mg by nebulization 4 (four) times daily.    budesonide (PULMICORT) 0.5 mg/2 mL nebulizer solution Take 0.5 mg by nebulization 2 (two) times daily. Controller    escitalopram oxalate (LEXAPRO) 20 MG tablet Take 20 mg by mouth once daily.    ferrous sulfate 325 (65 FE) MG EC tablet Take 325 mg by mouth once daily.    fluticasone-salmeterol 250-50 mcg/dose (ADVAIR) 250-50 mcg/dose diskus inhaler Inhale 1 puff into the lungs 2 (two) times daily. Controller    oxybutynin (DITROPAN-XL) 10 MG 24 hr tablet Take 10 mg by mouth once daily.    sulfamethoxazole-trimethoprim 800-160mg (BACTRIM DS) 800-160 mg Tab Take 1 tablet by mouth every 12 (twelve) hours.    tolterodine (DETROL LA) 2 MG Cp24 Take 4 mg by mouth once daily.     Family History     None        Tobacco Use    Smoking status: Unknown If Ever Smoked   Substance and Sexual Activity    Alcohol use: No    Drug use: No    Sexual activity: Not on file     Review of Systems   Unable to perform ROS: Dementia     Objective:     Vital Signs (Most Recent):  Temp: 98.3 °F (36.8 °C) (04/06/20 0344)  Pulse: 60 (04/06/20 0344)  Resp: 19 (04/06/20 0344)  BP: 137/88 (04/06/20 0344)  SpO2: 97 % (04/06/20 0344) Vital Signs (24h Range):  Temp:  [98 °F (36.7 °C)-99.2 °F (37.3 °C)] 98.3 °F (36.8 °C)  Pulse:  [49-78] 60  Resp:  [14-27] 19  SpO2:  [89 %-100 %] 97 %  BP:  ()/(53-88) 137/88     Weight: 59 kg (130 lb)  Body mass index is 23.78 kg/m².    Physical Exam   Constitutional: No distress.   HENT:   Head: Normocephalic and atraumatic.   Eyes: Pupils are equal, round, and reactive to light. EOM are normal.   Neck: Normal range of motion. No tracheal deviation present.   Cardiovascular: Normal rate and regular rhythm.   Pulmonary/Chest: Effort normal. She has wheezes. She has rales.   Abdominal: Soft. Bowel sounds are normal.   +colosotomy   Musculoskeletal: Normal range of motion. She exhibits no deformity.   Neurological: She is alert. She exhibits normal muscle tone.   Skin: Skin is warm. Capillary refill takes 2 to 3 seconds. She is not diaphoretic. No pallor.   Psychiatric: She has a normal mood and affect. Her behavior is normal.   Vitals reviewed.

## 2020-04-06 NOTE — H&P
[facility] Hospital Medicine  History & Physical    Patient Name: Estefany Gonzalez  MRN: [id number]  Admission Date: 4/5/2020  Attending Physician: Danilo Llanos MD   Primary Care Provider: Primary Doctor No         Patient information was obtained from ER records.     Subjective:     Principal Problem:<principal problem not specified>    Chief Complaint:   Chief Complaint   Patient presents with    Cough     EMS reports pt from [facility] today for cough and fever.         HPI: Pt is a 65 yo NH resident with dementia who presents for cough and fever.  Pt's roommate is in the ER too with milder symptoms.  In the ER pt was noted to have hypoxia and a UTI.  Pt had COVID testing done which was positive.  Pt unable to contriubte much to history.  In the ER pt was started on plaquenil and azithromycin for the COVID respiratory disease, and ceftriaxone for the UTI.      Past Medical History:   Diagnosis Date    Anemia     CHF (congestive heart failure)     COPD (chronic obstructive pulmonary disease)     Dementia     Diabetes mellitus     Hypertension     UTI (urinary tract infection)        Past Surgical History:   Procedure Laterality Date    COLOSTOMY         Review of patient's allergies indicates:   Allergen Reactions    Ibuprofen     Penicillins        No current facility-administered medications on file prior to encounter.      Current Outpatient Medications on File Prior to Encounter   Medication Sig    albuterol sulfate 2.5 mg/0.5 mL Nebu Take 2.5 mg by nebulization 4 (four) times daily.    budesonide (PULMICORT) 0.5 mg/2 mL nebulizer solution Take 0.5 mg by nebulization 2 (two) times daily. Controller    escitalopram oxalate (LEXAPRO) 20 MG tablet Take 20 mg by mouth once daily.    ferrous sulfate 325 (65 FE) MG EC tablet Take 325 mg by mouth once daily.    fluticasone-salmeterol 250-50 mcg/dose (ADVAIR) 250-50 mcg/dose diskus inhaler Inhale 1 puff into  the lungs 2 (two) times daily. Controller    oxybutynin (DITROPAN-XL) 10 MG 24 hr tablet Take 10 mg by mouth once daily.    sulfamethoxazole-trimethoprim 800-160mg (BACTRIM DS) 800-160 mg Tab Take 1 tablet by mouth every 12 (twelve) hours.    tolterodine (DETROL LA) 2 MG Cp24 Take 4 mg by mouth once daily.     Family History     None        Tobacco Use    Smoking status: Unknown If Ever Smoked   Substance and Sexual Activity    Alcohol use: No    Drug use: No    Sexual activity: Not on file     Review of Systems   Unable to perform ROS: Dementia     Objective:     Vital Signs (Most Recent):  Temp: 98.3 °F (36.8 °C) (04/06/20 0344)  Pulse: 60 (04/06/20 0344)  Resp: 19 (04/06/20 0344)  BP: 137/88 (04/06/20 0344)  SpO2: 97 % (04/06/20 0344) Vital Signs (24h Range):  Temp:  [98 °F (36.7 °C)-99.2 °F (37.3 °C)] 98.3 °F (36.8 °C)  Pulse:  [49-78] 60  Resp:  [14-27] 19  SpO2:  [89 %-100 %] 97 %  BP: ()/(53-88) 137/88     Weight: 59 kg (130 lb)  Body mass index is 23.78 kg/m².    Physical Exam   Constitutional: No distress.   HENT:   Head: Normocephalic and atraumatic.   Eyes: Pupils are equal, round, and reactive to light. EOM are normal.   Neck: Normal range of motion. No tracheal deviation present.   Cardiovascular: Normal rate and regular rhythm.   Pulmonary/Chest: Effort normal. She has wheezes. She has rales.   Abdominal: Soft. Bowel sounds are normal.   +colosotomy   Musculoskeletal: Normal range of motion. She exhibits no deformity.   Neurological: She is alert. She exhibits normal muscle tone.   Skin: Skin is warm. Capillary refill takes 2 to 3 seconds. She is not diaphoretic. No pallor.   Psychiatric: She has a normal mood and affect. Her behavior is normal.   Vitals reviewed.        Assessment/Plan:     COVID-19  - COVID-19 testing   - Infection Control notified     - Isolation:   - Airborne, Contact and Droplet Precautions  - Cohort patients into COVID units  - N95 masks must be fit tested, wear eye  "protection  - 20 second hand hygiene              - Limit visitors per hospital policy              - Consolidating lab draws, nursing care, provider visits, and interventions    - Diagnostics: (leukopenia, hyponatremia, hyperferritinemia, elevated troponin, elevated d-dimer, age, and comorbidities are significant predictors of poor clinical outcome)  CBC, CMP, Procalcitonin, Ferritin, LDH and Portable CXR    - Management:  Supplemental O2 to maintain SpO2 >92%  Continuous/intermittent Pulse Ox  Albuterol INHALER PRN (avoid nebulization of secretions)  Avoiding BiPAP to prevent aerosolization (including home BiPAP)   Azithromycin + plaquenil                UTI (urinary tract infection)  Rocephin.  F/u urine cultures      Nursing home resident  Pt's roommate is sick as well and admitted to the hospital.      Abnormal CT scan    CT report: "The lungs demonstrate chronic and atelectatic change mild infiltrates are noted in there is peribronchial thickening noted as discussed above.  The main pulmonary artery appears enlarged, correlation for pulmonary hypertension is needed.  Staghorn calculus of the left kidney with appearance of mild perinephric and periureteral stranding without obstructive uropathy which clinical and historical correlation is needed. Hypodense thyroid lesion on the right."    CHF (congestive heart failure)  Per chart review.  Monitor.       HTN (hypertension)  Review home meds once med rec complete.  IV hydralazine PRN      DM2 (diabetes mellitus, type 2)  Per chart review.  Unclear if correct, will check hemoglobin A1C prior to starting SSI as glucose levels <100.      Dementia  Chronic, monitor      COPD (chronic obstructive pulmonary disease)  Spiriva, albuterol HFA prn        VTE Risk Mitigation (From admission, onward)         Ordered     enoxaparin injection 40 mg  Daily      04/06/20 0408     IP VTE LOW RISK PATIENT  Once      04/06/20 0347     Place JUAN hose  Until discontinued      " 04/06/20 0347     Place sequential compression device  Until discontinued      04/06/20 0347                   Sarthak Magaña MD  Department of Hospital Medicine   [facility]

## 2020-04-06 NOTE — ED NOTES
Lab unable to obtain blood culture . Pt pulled iv access out. Gauze applied to control bleeding at this time

## 2020-04-07 LAB
ANION GAP SERPL CALC-SCNC: 7 MMOL/L (ref 8–16)
BASOPHILS # BLD AUTO: 0 K/UL (ref 0–0.2)
BASOPHILS NFR BLD: 0 % (ref 0–1.9)
BUN SERPL-MCNC: 25 MG/DL (ref 8–23)
CALCIUM SERPL-MCNC: 7.9 MG/DL (ref 8.7–10.5)
CHLORIDE SERPL-SCNC: 102 MMOL/L (ref 95–110)
CO2 SERPL-SCNC: 28 MMOL/L (ref 23–29)
CREAT SERPL-MCNC: 0.8 MG/DL (ref 0.5–1.4)
DIFFERENTIAL METHOD: ABNORMAL
EOSINOPHIL # BLD AUTO: 0 K/UL (ref 0–0.5)
EOSINOPHIL NFR BLD: 0 % (ref 0–8)
ERYTHROCYTE [DISTWIDTH] IN BLOOD BY AUTOMATED COUNT: 14.2 % (ref 11.5–14.5)
EST. GFR  (AFRICAN AMERICAN): >60 ML/MIN/1.73 M^2
EST. GFR  (NON AFRICAN AMERICAN): >60 ML/MIN/1.73 M^2
GLUCOSE SERPL-MCNC: 69 MG/DL (ref 70–110)
HCT VFR BLD AUTO: 36.2 % (ref 37–48.5)
HGB BLD-MCNC: 10.9 G/DL (ref 12–16)
IMM GRANULOCYTES # BLD AUTO: 0.01 K/UL (ref 0–0.04)
IMM GRANULOCYTES NFR BLD AUTO: 0.2 % (ref 0–0.5)
LYMPHOCYTES # BLD AUTO: 1.8 K/UL (ref 1–4.8)
LYMPHOCYTES NFR BLD: 43.1 % (ref 18–48)
MCH RBC QN AUTO: 26.2 PG (ref 27–31)
MCHC RBC AUTO-ENTMCNC: 30.1 G/DL (ref 32–36)
MCV RBC AUTO: 87 FL (ref 82–98)
MONOCYTES # BLD AUTO: 0.6 K/UL (ref 0.3–1)
MONOCYTES NFR BLD: 13.1 % (ref 4–15)
NEUTROPHILS # BLD AUTO: 1.8 K/UL (ref 1.8–7.7)
NEUTROPHILS NFR BLD: 43.6 % (ref 38–73)
NRBC BLD-RTO: 0 /100 WBC
PLATELET # BLD AUTO: 136 K/UL (ref 150–350)
PMV BLD AUTO: 10.8 FL (ref 9.2–12.9)
POTASSIUM SERPL-SCNC: 3.9 MMOL/L (ref 3.5–5.1)
RBC # BLD AUTO: 4.16 M/UL (ref 4–5.4)
SODIUM SERPL-SCNC: 137 MMOL/L (ref 136–145)
WBC # BLD AUTO: 4.2 K/UL (ref 3.9–12.7)

## 2020-04-07 PROCEDURE — 36415 COLL VENOUS BLD VENIPUNCTURE: CPT

## 2020-04-07 PROCEDURE — 85025 COMPLETE CBC W/AUTO DIFF WBC: CPT

## 2020-04-07 PROCEDURE — 94761 N-INVAS EAR/PLS OXIMETRY MLT: CPT

## 2020-04-07 PROCEDURE — 63600175 PHARM REV CODE 636 W HCPCS: Performed by: FAMILY MEDICINE

## 2020-04-07 PROCEDURE — 25000003 PHARM REV CODE 250: Performed by: FAMILY MEDICINE

## 2020-04-07 PROCEDURE — 21400001 HC TELEMETRY ROOM

## 2020-04-07 PROCEDURE — 80048 BASIC METABOLIC PNL TOTAL CA: CPT

## 2020-04-07 PROCEDURE — 25000242 PHARM REV CODE 250 ALT 637 W/ HCPCS: Performed by: FAMILY MEDICINE

## 2020-04-07 PROCEDURE — 27000221 HC OXYGEN, UP TO 24 HOURS

## 2020-04-07 PROCEDURE — U0002 COVID-19 LAB TEST NON-CDC: HCPCS

## 2020-04-07 PROCEDURE — 25000003 PHARM REV CODE 250: Performed by: HOSPITALIST

## 2020-04-07 RX ADMIN — ACETAMINOPHEN 650 MG: 325 TABLET ORAL at 09:04

## 2020-04-07 RX ADMIN — ENOXAPARIN SODIUM 40 MG: 100 INJECTION SUBCUTANEOUS at 08:04

## 2020-04-07 RX ADMIN — FAMOTIDINE 20 MG: 20 TABLET ORAL at 09:04

## 2020-04-07 RX ADMIN — VANCOMYCIN HYDROCHLORIDE 1500 MG: 1.5 INJECTION, POWDER, LYOPHILIZED, FOR SOLUTION INTRAVENOUS at 09:04

## 2020-04-07 RX ADMIN — FAMOTIDINE 20 MG: 20 TABLET ORAL at 08:04

## 2020-04-07 RX ADMIN — CEFTRIAXONE 1 G: 1 INJECTION, SOLUTION INTRAVENOUS at 12:04

## 2020-04-07 RX ADMIN — ACETAMINOPHEN 650 MG: 325 TABLET ORAL at 12:04

## 2020-04-07 RX ADMIN — TIOTROPIUM BROMIDE 18 MCG: 18 CAPSULE ORAL; RESPIRATORY (INHALATION) at 09:04

## 2020-04-07 RX ADMIN — ALBUTEROL SULFATE 2 PUFF: 90 AEROSOL, METERED RESPIRATORY (INHALATION) at 09:04

## 2020-04-07 NOTE — PROGRESS NOTES
[facility] Hospital Medicine  Progress Note    Patient Name: Estefany Gonzalez  MRN: [id number]  Patient Class: IP- Inpatient   Admission Date: 4/5/2020  Length of Stay: 1 days  Attending Physician: Sebastien Freeman MD  Primary Care Provider: Primary Doctor No        Subjective:     Principal Problem:UTI (urinary tract infection)        HPI:  Pt is a 65 yo NH resident with dementia who presents for cough and fever.  Pt's roommate is in the ER too with milder symptoms.  In the ER pt was noted to have hypoxia and a UTI.  Pt had COVID testing done which was positive.  Pt unable to contriubte much to history.  In the ER pt was started on plaquenil and azithromycin for the COVID respiratory disease, and ceftriaxone for the UTI.      Overview/Hospital Course:  No notes on file    Interval History: Patient non-communicative while I evaluated, although noted to converse with nurse after I left the room. covid resulted negative although patient high risk for infection.     Review of Systems   Constitutional: Negative for activity change, appetite change, chills, diaphoresis, fatigue and fever.   HENT: Negative for congestion, postnasal drip, rhinorrhea, sinus pressure, sinus pain and sneezing.    Respiratory: Negative for cough, chest tightness, shortness of breath, wheezing and stridor.    Cardiovascular: Negative for chest pain, palpitations and leg swelling.   Gastrointestinal: Negative for abdominal distention, abdominal pain, blood in stool, constipation, diarrhea and nausea.   Endocrine: Negative for cold intolerance and heat intolerance.   Genitourinary: Negative for dysuria, flank pain and hematuria.   Musculoskeletal: Negative for gait problem.   Neurological: Negative for dizziness and weakness.   Psychiatric/Behavioral: Negative for agitation and behavioral problems.     Objective:     Vital Signs (Most Recent):  Temp: 97.5 °F (36.4 °C) (04/07/20 1131)  Pulse: (!) 58 (04/07/20 1235)  Resp: 19  "(04/07/20 1131)  BP: 99/66 (04/07/20 1131)  SpO2: 95 % (04/07/20 1131) Vital Signs (24h Range):  Temp:  [97.5 °F (36.4 °C)-100.1 °F (37.8 °C)] 97.5 °F (36.4 °C)  Pulse:  [44-79] 58  Resp:  [12-20] 19  SpO2:  [94 %-98 %] 95 %  BP: ()/(55-72) 99/66     Weight: 61 kg (134 lb 7.7 oz)  Body mass index is 26.26 kg/m².    Intake/Output Summary (Last 24 hours) at 4/7/2020 1436  Last data filed at 4/7/2020 0600  Gross per 24 hour   Intake 720 ml   Output 1000 ml   Net -280 ml      Physical Exam   Constitutional: She appears well-developed and well-nourished.   HENT:   Head: Normocephalic and atraumatic.   Eyes: Pupils are equal, round, and reactive to light. EOM are normal.   Neck: Normal range of motion. Neck supple. No JVD present.   Cardiovascular: Normal rate, regular rhythm, normal heart sounds and intact distal pulses. Exam reveals no gallop and no friction rub.   No murmur heard.  Pulmonary/Chest: Effort normal and breath sounds normal. No stridor. No respiratory distress. She has no rales.   Abdominal: Soft. Bowel sounds are normal. She exhibits no distension. There is no tenderness.   Musculoskeletal: Normal range of motion. She exhibits no edema or tenderness.   Neurological: She is alert. No cranial nerve deficit.   Skin: Skin is warm and dry.   Psychiatric: She has a normal mood and affect. Her behavior is normal.       Significant Labs: All pertinent labs within the past 24 hours have been reviewed.    Significant Imaging: I have reviewed all pertinent imaging results/findings within the past 24 hours.      Assessment/Plan:      * UTI (urinary tract infection)  Rocephin  Urine cx growing GNR  Awaiting final report      Abnormal CT scan  CT report: "The lungs demonstrate chronic and atelectatic change mild infiltrates are noted in there is peribronchial thickening noted as discussed above.  The main pulmonary artery appears enlarged, correlation for pulmonary hypertension is needed.  Staghorn calculus of " "the left kidney with appearance of mild perinephric and periureteral stranding without obstructive uropathy which clinical and historical correlation is needed. Hypodense thyroid lesion on the right."    CHF (congestive heart failure)  Per chart review.  Monitor.       HTN (hypertension)  IV hydralazine PRN      DM2 (diabetes mellitus, type 2)  Per chart review.  Unclear if correct, will check hemoglobin A1C prior to starting SSI as glucose levels <100.      Dementia  Chronic, monitor      COPD (chronic obstructive pulmonary disease)  Spiriva, albuterol HFA prn      Nursing home resident  Pt's roommate is sick as well and admitted to the hospital.      COVID-19  - Isolation:   - Airborne, Contact and Droplet Precautions  - Cohort patients into COVID units  - N95 masks must be fit tested, wear eye protection  - 20 second hand hygiene              - Limit visitors per hospital policy              - Consolidating lab draws, nursing care, provider visits, and interventions    - Diagnostics: (leukopenia, hyponatremia, hyperferritinemia, elevated troponin, elevated d-dimer, age, and comorbidities are significant predictors of poor clinical outcome)  CBC, CMP, Procalcitonin, Ferritin, LDH and Portable CXR    - Management:  Supplemental O2 to maintain SpO2 >92%  Continuous/intermittent Pulse Ox  Albuterol INHALER PRN (avoid nebulization of secretions)  Avoiding BiPAP to prevent aerosolization (including home BiPAP)   Azithromycin + plaquenil completed  covid resulted negative  Cont isolation as patient has high risk for infection (roommate was positive)  Will repeat covid swab                VTE Risk Mitigation (From admission, onward)         Ordered     enoxaparin injection 40 mg  Daily      04/06/20 0408     IP VTE LOW RISK PATIENT  Once      04/06/20 0347     Place JUAN hose  Until discontinued      04/06/20 0347     Place sequential compression device  Until discontinued      04/06/20 0347                      Sebastien MUSE " MD Pete  Department of Hospital Medicine   [facility]

## 2020-04-07 NOTE — ASSESSMENT & PLAN NOTE
- Isolation:   - Airborne, Contact and Droplet Precautions  - Cohort patients into COVID units  - N95 masks must be fit tested, wear eye protection  - 20 second hand hygiene              - Limit visitors per hospital policy              - Consolidating lab draws, nursing care, provider visits, and interventions    - Diagnostics: (leukopenia, hyponatremia, hyperferritinemia, elevated troponin, elevated d-dimer, age, and comorbidities are significant predictors of poor clinical outcome)  CBC, CMP, Procalcitonin, Ferritin, LDH and Portable CXR    - Management:  Supplemental O2 to maintain SpO2 >92%  Continuous/intermittent Pulse Ox  Albuterol INHALER PRN (avoid nebulization of secretions)  Avoiding BiPAP to prevent aerosolization (including home BiPAP)   Azithromycin + plaquenil completed  covid resulted negative  Cont isolation as patient has high risk for infection (roommate was positive)  Will repeat covid swab

## 2020-04-07 NOTE — NURSING
1930: Report received from Polo MUNOZ RN. Patient cares assumed.     2110: Upon entering the room, patient observed lying in bed with cardiac monitoring in progress. Vital signs obtained and can bed found in flow sheets with complete patient assessment. Skin dry and flaky with scabs and scars all over body. No complaints of pain or signs of respiratory distress. Plan to continue IV antibiotics for UTI and wean O2. Patient remains stable at this time and will continue to be monitored.

## 2020-04-07 NOTE — ASSESSMENT & PLAN NOTE
"CT report: "The lungs demonstrate chronic and atelectatic change mild infiltrates are noted in there is peribronchial thickening noted as discussed above.  The main pulmonary artery appears enlarged, correlation for pulmonary hypertension is needed.  Staghorn calculus of the left kidney with appearance of mild perinephric and periureteral stranding without obstructive uropathy which clinical and historical correlation is needed. Hypodense thyroid lesion on the right."  "

## 2020-04-07 NOTE — PLAN OF CARE
Problem: Skin Injury Risk Increased  Goal: Skin Health and Integrity  Intervention: Optimize Skin Protection  Flowsheets (Taken 4/7/2020 0527)  Head of Bed (HOB): HOB at 60 degrees  Intervention: Promote and Optimize Oral Intake  Flowsheets (Taken 4/7/2020 0527)  Oral Nutrition Promotion: calorie dense foods provided; calorie dense liquids provided     Problem: Adult Inpatient Plan of Care  Goal: Plan of Care Review  Flowsheets (Taken 4/7/2020 0527)  Plan of Care Reviewed With: patient  Goal: Absence of Hospital-Acquired Illness or Injury  Intervention: Identify and Manage Fall Risk  Flowsheets (Taken 4/7/2020 0527)  Safety Promotion/Fall Prevention: bed alarm set; room near unit station; side rails raised x 2; instructed to call staff for mobility  Intervention: Prevent VTE (venous thromboembolism)  Flowsheets (Taken 4/7/2020 0527)  VTE Prevention/Management: ROM (passive) performed  Goal: Optimal Comfort and Wellbeing  Intervention: Provide Person-Centered Care  Flowsheets (Taken 4/7/2020 0527)  Trust Relationship/Rapport: thoughts/feelings acknowledged; questions answered; questions encouraged     Problem: Infection  Goal: Infection Symptom Resolution  Intervention: Prevent or Manage Infection  Flowsheets (Taken 4/7/2020 0527)  Fever Reduction/Comfort Measures: medication administered  Infection Management: aseptic technique maintained; cultures obtained and sent to lab  Isolation Precautions: airborne precautions maintained; contact precautions maintained; droplet precautions maintained; protective environment maintained

## 2020-04-07 NOTE — SUBJECTIVE & OBJECTIVE
Interval History: Patient non-communicative while I evaluated, although noted to converse with nurse after I left the room. covid resulted negative although patient high risk for infection.     Review of Systems   Constitutional: Negative for activity change, appetite change, chills, diaphoresis, fatigue and fever.   HENT: Negative for congestion, postnasal drip, rhinorrhea, sinus pressure, sinus pain and sneezing.    Respiratory: Negative for cough, chest tightness, shortness of breath, wheezing and stridor.    Cardiovascular: Negative for chest pain, palpitations and leg swelling.   Gastrointestinal: Negative for abdominal distention, abdominal pain, blood in stool, constipation, diarrhea and nausea.   Endocrine: Negative for cold intolerance and heat intolerance.   Genitourinary: Negative for dysuria, flank pain and hematuria.   Musculoskeletal: Negative for gait problem.   Neurological: Negative for dizziness and weakness.   Psychiatric/Behavioral: Negative for agitation and behavioral problems.     Objective:     Vital Signs (Most Recent):  Temp: 97.5 °F (36.4 °C) (04/07/20 1131)  Pulse: (!) 58 (04/07/20 1235)  Resp: 19 (04/07/20 1131)  BP: 99/66 (04/07/20 1131)  SpO2: 95 % (04/07/20 1131) Vital Signs (24h Range):  Temp:  [97.5 °F (36.4 °C)-100.1 °F (37.8 °C)] 97.5 °F (36.4 °C)  Pulse:  [44-79] 58  Resp:  [12-20] 19  SpO2:  [94 %-98 %] 95 %  BP: ()/(55-72) 99/66     Weight: 61 kg (134 lb 7.7 oz)  Body mass index is 26.26 kg/m².    Intake/Output Summary (Last 24 hours) at 4/7/2020 1436  Last data filed at 4/7/2020 0600  Gross per 24 hour   Intake 720 ml   Output 1000 ml   Net -280 ml      Physical Exam   Constitutional: She appears well-developed and well-nourished.   HENT:   Head: Normocephalic and atraumatic.   Eyes: Pupils are equal, round, and reactive to light. EOM are normal.   Neck: Normal range of motion. Neck supple. No JVD present.   Cardiovascular: Normal rate, regular rhythm, normal heart  sounds and intact distal pulses. Exam reveals no gallop and no friction rub.   No murmur heard.  Pulmonary/Chest: Effort normal and breath sounds normal. No stridor. No respiratory distress. She has no rales.   Abdominal: Soft. Bowel sounds are normal. She exhibits no distension. There is no tenderness.   Musculoskeletal: Normal range of motion. She exhibits no edema or tenderness.   Neurological: She is alert. No cranial nerve deficit.   Skin: Skin is warm and dry.   Psychiatric: She has a normal mood and affect. Her behavior is normal.       Significant Labs: All pertinent labs within the past 24 hours have been reviewed.    Significant Imaging: I have reviewed all pertinent imaging results/findings within the past 24 hours.

## 2020-04-07 NOTE — ASSESSMENT & PLAN NOTE
Per chart review.  Unclear if correct, will check hemoglobin A1C prior to starting SSI as glucose levels <100.

## 2020-04-08 LAB
ANION GAP SERPL CALC-SCNC: 9 MMOL/L (ref 8–16)
BACTERIA UR CULT: ABNORMAL
BACTERIA UR CULT: ABNORMAL
BASOPHILS # BLD AUTO: 0.01 K/UL (ref 0–0.2)
BASOPHILS NFR BLD: 0.2 % (ref 0–1.9)
BUN SERPL-MCNC: 19 MG/DL (ref 8–23)
CALCIUM SERPL-MCNC: 7.9 MG/DL (ref 8.7–10.5)
CHLORIDE SERPL-SCNC: 102 MMOL/L (ref 95–110)
CO2 SERPL-SCNC: 27 MMOL/L (ref 23–29)
CREAT SERPL-MCNC: 0.8 MG/DL (ref 0.5–1.4)
DIFFERENTIAL METHOD: ABNORMAL
EOSINOPHIL # BLD AUTO: 0 K/UL (ref 0–0.5)
EOSINOPHIL NFR BLD: 0.2 % (ref 0–8)
ERYTHROCYTE [DISTWIDTH] IN BLOOD BY AUTOMATED COUNT: 14 % (ref 11.5–14.5)
EST. GFR  (AFRICAN AMERICAN): >60 ML/MIN/1.73 M^2
EST. GFR  (NON AFRICAN AMERICAN): >60 ML/MIN/1.73 M^2
GLUCOSE SERPL-MCNC: 79 MG/DL (ref 70–110)
HCT VFR BLD AUTO: 35.3 % (ref 37–48.5)
HGB BLD-MCNC: 11.2 G/DL (ref 12–16)
IMM GRANULOCYTES # BLD AUTO: 0.01 K/UL (ref 0–0.04)
IMM GRANULOCYTES NFR BLD AUTO: 0.2 % (ref 0–0.5)
LYMPHOCYTES # BLD AUTO: 1.5 K/UL (ref 1–4.8)
LYMPHOCYTES NFR BLD: 35.2 % (ref 18–48)
MCH RBC QN AUTO: 26.4 PG (ref 27–31)
MCHC RBC AUTO-ENTMCNC: 31.7 G/DL (ref 32–36)
MCV RBC AUTO: 83 FL (ref 82–98)
MONOCYTES # BLD AUTO: 0.5 K/UL (ref 0.3–1)
MONOCYTES NFR BLD: 11.2 % (ref 4–15)
NEUTROPHILS # BLD AUTO: 2.3 K/UL (ref 1.8–7.7)
NEUTROPHILS NFR BLD: 53 % (ref 38–73)
NRBC BLD-RTO: 1 /100 WBC
PLATELET # BLD AUTO: 206 K/UL (ref 150–350)
PMV BLD AUTO: 11.1 FL (ref 9.2–12.9)
POTASSIUM SERPL-SCNC: 3.9 MMOL/L (ref 3.5–5.1)
RBC # BLD AUTO: 4.24 M/UL (ref 4–5.4)
SODIUM SERPL-SCNC: 138 MMOL/L (ref 136–145)
WBC # BLD AUTO: 4.37 K/UL (ref 3.9–12.7)

## 2020-04-08 PROCEDURE — 25000003 PHARM REV CODE 250: Performed by: HOSPITALIST

## 2020-04-08 PROCEDURE — 21400001 HC TELEMETRY ROOM

## 2020-04-08 PROCEDURE — 25000003 PHARM REV CODE 250: Performed by: FAMILY MEDICINE

## 2020-04-08 PROCEDURE — 80048 BASIC METABOLIC PNL TOTAL CA: CPT

## 2020-04-08 PROCEDURE — 63600175 PHARM REV CODE 636 W HCPCS: Performed by: FAMILY MEDICINE

## 2020-04-08 PROCEDURE — 36415 COLL VENOUS BLD VENIPUNCTURE: CPT

## 2020-04-08 PROCEDURE — 85025 COMPLETE CBC W/AUTO DIFF WBC: CPT

## 2020-04-08 RX ADMIN — FAMOTIDINE 20 MG: 20 TABLET ORAL at 09:04

## 2020-04-08 RX ADMIN — FAMOTIDINE 20 MG: 20 TABLET ORAL at 08:04

## 2020-04-08 RX ADMIN — CEFTRIAXONE 1 G: 1 INJECTION, SOLUTION INTRAVENOUS at 01:04

## 2020-04-08 RX ADMIN — ACETAMINOPHEN 650 MG: 325 TABLET ORAL at 03:04

## 2020-04-08 RX ADMIN — TIOTROPIUM BROMIDE 18 MCG: 18 CAPSULE ORAL; RESPIRATORY (INHALATION) at 08:04

## 2020-04-08 RX ADMIN — ENOXAPARIN SODIUM 40 MG: 100 INJECTION SUBCUTANEOUS at 09:04

## 2020-04-08 NOTE — ASSESSMENT & PLAN NOTE
- Isolation:   - Airborne, Contact and Droplet Precautions  - Cohort patients into COVID units  - N95 masks must be fit tested, wear eye protection  - 20 second hand hygiene              - Limit visitors per hospital policy              - Consolidating lab draws, nursing care, provider visits, and interventions    - Diagnostics: (leukopenia, hyponatremia, hyperferritinemia, elevated troponin, elevated d-dimer, age, and comorbidities are significant predictors of poor clinical outcome)  CBC, CMP, Procalcitonin, Ferritin, LDH and Portable CXR    - Management:  Supplemental O2 to maintain SpO2 >92%  Continuous/intermittent Pulse Ox  Albuterol INHALER PRN (avoid nebulization of secretions)  Avoiding BiPAP to prevent aerosolization (including home BiPAP)     Azithromycin + plaquenil completed  covid resulted negative  Repeat swab obtained, results pending  Cont isolation as patient has high risk for infection (roommate was positive)

## 2020-04-08 NOTE — PROGRESS NOTES
[facility] Hospital Medicine  Progress Note    Patient Name: Estefany Gonzalez  MRN: [id number]  Patient Class: IP- Inpatient   Admission Date: 4/5/2020  Length of Stay: 2 days  Attending Physician: Sebastien Freeman MD  Primary Care Provider: Primary Doctor No        Subjective:     Principal Problem:UTI (urinary tract infection)        HPI:  Pt is a 65 yo NH resident with dementia who presents for cough and fever.  Pt's roommate is in the ER too with milder symptoms.  In the ER pt was noted to have hypoxia and a UTI.  Pt had COVID testing done which was positive.  Pt unable to contriubte much to history.  In the ER pt was started on plaquenil and azithromycin for the COVID respiratory disease, and ceftriaxone for the UTI.      Overview/Hospital Course:  No notes on file    Interval History: Patient more responsive today. Had fever spikes overnight. No cough, dysuria, abd pain, nausea, vomiting, diarrhea. Has byrd in place draining clear yellow urine.      Review of Systems   Constitutional: Negative for activity change, appetite change, chills, diaphoresis, fatigue and fever.   HENT: Negative for congestion, postnasal drip, rhinorrhea, sinus pressure, sinus pain and sneezing.    Respiratory: Negative for cough, chest tightness, shortness of breath, wheezing and stridor.    Cardiovascular: Negative for chest pain, palpitations and leg swelling.   Gastrointestinal: Negative for abdominal distention, abdominal pain, blood in stool, constipation, diarrhea and nausea.   Endocrine: Negative for cold intolerance and heat intolerance.   Genitourinary: Negative for dysuria, flank pain and hematuria.   Musculoskeletal: Negative for gait problem.   Neurological: Negative for dizziness and weakness.   Psychiatric/Behavioral: Negative for agitation and behavioral problems.     Objective:     Vital Signs (Most Recent):  Temp: 98.4 °F (36.9 °C) (04/08/20 1121)  Pulse: 67 (04/08/20 1121)  Resp: 19 (04/08/20  "1121)  BP: 120/68 (04/08/20 1121)  SpO2: (!) 92 % (04/08/20 1121) Vital Signs (24h Range):  Temp:  [97.2 °F (36.2 °C)-100.9 °F (38.3 °C)] 98.4 °F (36.9 °C)  Pulse:  [66-77] 67  Resp:  [18-22] 19  SpO2:  [91 %-95 %] 92 %  BP: (112-136)/(65-81) 120/68     Weight: 61 kg (134 lb 7.7 oz)  Body mass index is 26.26 kg/m².    Intake/Output Summary (Last 24 hours) at 4/8/2020 1554  Last data filed at 4/8/2020 0200  Gross per 24 hour   Intake 480 ml   Output 2000 ml   Net -1520 ml      Physical Exam   Constitutional: She appears well-developed and well-nourished.   HENT:   Head: Normocephalic and atraumatic.   Eyes: Pupils are equal, round, and reactive to light. EOM are normal.   Neck: Normal range of motion. Neck supple. No JVD present.   Cardiovascular: Normal rate, regular rhythm, normal heart sounds and intact distal pulses. Exam reveals no gallop and no friction rub.   No murmur heard.  Pulmonary/Chest: Effort normal and breath sounds normal. No stridor. No respiratory distress. She has no rales.   Abdominal: Soft. Bowel sounds are normal. She exhibits no distension. There is no tenderness.   Musculoskeletal: Normal range of motion. She exhibits no edema or tenderness.   Neurological: She is alert. No cranial nerve deficit.   Skin: Skin is warm and dry.   Psychiatric: She has a normal mood and affect. Her behavior is normal.       Significant Labs: All pertinent labs within the past 24 hours have been reviewed.    Significant Imaging: I have reviewed all pertinent imaging results/findings within the past 24 hours.      Assessment/Plan:      * UTI (urinary tract infection)  Rocephin  Urine cx growing proteus and providencia - both sensitive to ceftriaxone        Abnormal CT scan  CT report: "The lungs demonstrate chronic and atelectatic change mild infiltrates are noted in there is peribronchial thickening noted as discussed above.  The main pulmonary artery appears enlarged, correlation for pulmonary hypertension is " "needed.  Staghorn calculus of the left kidney with appearance of mild perinephric and periureteral stranding without obstructive uropathy which clinical and historical correlation is needed. Hypodense thyroid lesion on the right."    CHF (congestive heart failure)  Per chart review.  Monitor.       HTN (hypertension)  IV hydralazine PRN      DM2 (diabetes mellitus, type 2)        Dementia  Chronic, monitor      COPD (chronic obstructive pulmonary disease)  Spiriva, albuterol HFA prn      Nursing home resident  Pt's roommate is sick as well and admitted to the hospital.      COVID-19  - Isolation:   - Airborne, Contact and Droplet Precautions  - Cohort patients into COVID units  - N95 masks must be fit tested, wear eye protection  - 20 second hand hygiene              - Limit visitors per hospital policy              - Consolidating lab draws, nursing care, provider visits, and interventions    - Diagnostics: (leukopenia, hyponatremia, hyperferritinemia, elevated troponin, elevated d-dimer, age, and comorbidities are significant predictors of poor clinical outcome)  CBC, CMP, Procalcitonin, Ferritin, LDH and Portable CXR    - Management:  Supplemental O2 to maintain SpO2 >92%  Continuous/intermittent Pulse Ox  Albuterol INHALER PRN (avoid nebulization of secretions)  Avoiding BiPAP to prevent aerosolization (including home BiPAP)     Azithromycin + plaquenil completed  covid resulted negative  Repeat swab obtained, results pending  Cont isolation as patient has high risk for infection (roommate was positive)                  VTE Risk Mitigation (From admission, onward)         Ordered     enoxaparin injection 40 mg  Daily      04/06/20 0408     IP VTE LOW RISK PATIENT  Once      04/06/20 0347     Place JUAN hose  Until discontinued      04/06/20 0347     Place sequential compression device  Until discontinued      04/06/20 0347                      Sebastien Freeman MD  Department of Hospital Medicine   [facility]

## 2020-04-08 NOTE — PLAN OF CARE
Problem: Adult Inpatient Plan of Care  Goal: Plan of Care Review  Flowsheets (Taken 4/8/2020 0438)  Plan of Care Reviewed With: patient     Problem: Infection  Goal: Infection Symptom Resolution  Intervention: Prevent or Manage Infection  Flowsheets (Taken 4/8/2020 0438)  Fever Reduction/Comfort Measures: medication administered  Infection Management: aseptic technique maintained; cultures obtained and sent to lab  Isolation Precautions: airborne precautions maintained; contact precautions maintained; droplet precautions maintained; protective environment maintained     Patient remained free of injury throughout the shift. Vital signs remained WNL. No complaints of pain or signs of respiratory distress noted. Patient repeatedly needing redirection and instruction on keeping lines in place. Plan to continue IV antibiotics, monitor temperatures, and await repeat results from COVID-19 test. Patient remains stable at this time and will continue to be monitored.

## 2020-04-08 NOTE — PROGRESS NOTES
04/08/20 0304 04/08/20 0441 04/08/20 0519   Vital Signs   Temp (!) 100.6 °F (38.1 °C) (!) 100.9 °F (38.3 °C) 98.7 °F (37.1 °C)     Patient with elevated temp as seen above. Tylenol administered and temp checked again and temp noted to have increased as seen at 0441. Nurse applied ice to patients underarms and rechecked temp as seen at 0519. Patient stable at this time and will continue to monitored.

## 2020-04-08 NOTE — SUBJECTIVE & OBJECTIVE
Interval History: Patient more responsive today. Had fever spikes overnight. No cough, dysuria, abd pain, nausea, vomiting, diarrhea. Has byrd in place draining clear yellow urine.      Review of Systems   Constitutional: Negative for activity change, appetite change, chills, diaphoresis, fatigue and fever.   HENT: Negative for congestion, postnasal drip, rhinorrhea, sinus pressure, sinus pain and sneezing.    Respiratory: Negative for cough, chest tightness, shortness of breath, wheezing and stridor.    Cardiovascular: Negative for chest pain, palpitations and leg swelling.   Gastrointestinal: Negative for abdominal distention, abdominal pain, blood in stool, constipation, diarrhea and nausea.   Endocrine: Negative for cold intolerance and heat intolerance.   Genitourinary: Negative for dysuria, flank pain and hematuria.   Musculoskeletal: Negative for gait problem.   Neurological: Negative for dizziness and weakness.   Psychiatric/Behavioral: Negative for agitation and behavioral problems.     Objective:     Vital Signs (Most Recent):  Temp: 98.4 °F (36.9 °C) (04/08/20 1121)  Pulse: 67 (04/08/20 1121)  Resp: 19 (04/08/20 1121)  BP: 120/68 (04/08/20 1121)  SpO2: (!) 92 % (04/08/20 1121) Vital Signs (24h Range):  Temp:  [97.2 °F (36.2 °C)-100.9 °F (38.3 °C)] 98.4 °F (36.9 °C)  Pulse:  [66-77] 67  Resp:  [18-22] 19  SpO2:  [91 %-95 %] 92 %  BP: (112-136)/(65-81) 120/68     Weight: 61 kg (134 lb 7.7 oz)  Body mass index is 26.26 kg/m².    Intake/Output Summary (Last 24 hours) at 4/8/2020 1554  Last data filed at 4/8/2020 0200  Gross per 24 hour   Intake 480 ml   Output 2000 ml   Net -1520 ml      Physical Exam   Constitutional: She appears well-developed and well-nourished.   HENT:   Head: Normocephalic and atraumatic.   Eyes: Pupils are equal, round, and reactive to light. EOM are normal.   Neck: Normal range of motion. Neck supple. No JVD present.   Cardiovascular: Normal rate, regular rhythm, normal heart sounds  and intact distal pulses. Exam reveals no gallop and no friction rub.   No murmur heard.  Pulmonary/Chest: Effort normal and breath sounds normal. No stridor. No respiratory distress. She has no rales.   Abdominal: Soft. Bowel sounds are normal. She exhibits no distension. There is no tenderness.   Musculoskeletal: Normal range of motion. She exhibits no edema or tenderness.   Neurological: She is alert. No cranial nerve deficit.   Skin: Skin is warm and dry.   Psychiatric: She has a normal mood and affect. Her behavior is normal.       Significant Labs: All pertinent labs within the past 24 hours have been reviewed.    Significant Imaging: I have reviewed all pertinent imaging results/findings within the past 24 hours.

## 2020-04-09 LAB
ANION GAP SERPL CALC-SCNC: 8 MMOL/L (ref 8–16)
BACTERIA BLD CULT: ABNORMAL
BASOPHILS # BLD AUTO: 0 K/UL (ref 0–0.2)
BASOPHILS NFR BLD: 0 % (ref 0–1.9)
BNP SERPL-MCNC: 19 PG/ML (ref 0–99)
BUN SERPL-MCNC: 16 MG/DL (ref 8–23)
CALCIUM SERPL-MCNC: 8.1 MG/DL (ref 8.7–10.5)
CHLORIDE SERPL-SCNC: 103 MMOL/L (ref 95–110)
CO2 SERPL-SCNC: 29 MMOL/L (ref 23–29)
CREAT SERPL-MCNC: 0.7 MG/DL (ref 0.5–1.4)
DIFFERENTIAL METHOD: ABNORMAL
EOSINOPHIL # BLD AUTO: 0 K/UL (ref 0–0.5)
EOSINOPHIL NFR BLD: 0.3 % (ref 0–8)
ERYTHROCYTE [DISTWIDTH] IN BLOOD BY AUTOMATED COUNT: 13.9 % (ref 11.5–14.5)
EST. GFR  (AFRICAN AMERICAN): >60 ML/MIN/1.73 M^2
EST. GFR  (NON AFRICAN AMERICAN): >60 ML/MIN/1.73 M^2
GLUCOSE SERPL-MCNC: 75 MG/DL (ref 70–110)
HCT VFR BLD AUTO: 37.2 % (ref 37–48.5)
HGB BLD-MCNC: 11.3 G/DL (ref 12–16)
IMM GRANULOCYTES # BLD AUTO: 0.02 K/UL (ref 0–0.04)
IMM GRANULOCYTES NFR BLD AUTO: 0.6 % (ref 0–0.5)
LYMPHOCYTES # BLD AUTO: 1.3 K/UL (ref 1–4.8)
LYMPHOCYTES NFR BLD: 34.8 % (ref 18–48)
MCH RBC QN AUTO: 26.2 PG (ref 27–31)
MCHC RBC AUTO-ENTMCNC: 30.4 G/DL (ref 32–36)
MCV RBC AUTO: 86 FL (ref 82–98)
MONOCYTES # BLD AUTO: 0.4 K/UL (ref 0.3–1)
MONOCYTES NFR BLD: 10 % (ref 4–15)
NEUTROPHILS # BLD AUTO: 2 K/UL (ref 1.8–7.7)
NEUTROPHILS NFR BLD: 54.3 % (ref 38–73)
NRBC BLD-RTO: 0 /100 WBC
PLATELET # BLD AUTO: 137 K/UL (ref 150–350)
PMV BLD AUTO: 10.6 FL (ref 9.2–12.9)
POTASSIUM SERPL-SCNC: 4.1 MMOL/L (ref 3.5–5.1)
RBC # BLD AUTO: 4.32 M/UL (ref 4–5.4)
SARS-COV-2 RNA RESP QL NAA+PROBE: DETECTED
SODIUM SERPL-SCNC: 140 MMOL/L (ref 136–145)
WBC # BLD AUTO: 3.59 K/UL (ref 3.9–12.7)

## 2020-04-09 PROCEDURE — 63600175 PHARM REV CODE 636 W HCPCS: Performed by: FAMILY MEDICINE

## 2020-04-09 PROCEDURE — 85025 COMPLETE CBC W/AUTO DIFF WBC: CPT

## 2020-04-09 PROCEDURE — 20000000 HC ICU ROOM

## 2020-04-09 PROCEDURE — 36415 COLL VENOUS BLD VENIPUNCTURE: CPT

## 2020-04-09 PROCEDURE — 83880 ASSAY OF NATRIURETIC PEPTIDE: CPT

## 2020-04-09 PROCEDURE — 25000003 PHARM REV CODE 250: Performed by: FAMILY MEDICINE

## 2020-04-09 PROCEDURE — 80048 BASIC METABOLIC PNL TOTAL CA: CPT

## 2020-04-09 RX ADMIN — FAMOTIDINE 20 MG: 20 TABLET ORAL at 08:04

## 2020-04-09 RX ADMIN — SENNOSIDES AND DOCUSATE SODIUM 1 TABLET: 8.6; 5 TABLET ORAL at 08:04

## 2020-04-09 RX ADMIN — FAMOTIDINE 20 MG: 20 TABLET ORAL at 09:04

## 2020-04-09 RX ADMIN — TIOTROPIUM BROMIDE 18 MCG: 18 CAPSULE ORAL; RESPIRATORY (INHALATION) at 09:04

## 2020-04-09 RX ADMIN — ENOXAPARIN SODIUM 40 MG: 100 INJECTION SUBCUTANEOUS at 08:04

## 2020-04-09 RX ADMIN — CEFTRIAXONE 1 G: 1 INJECTION, SOLUTION INTRAVENOUS at 12:04

## 2020-04-09 RX ADMIN — SENNOSIDES AND DOCUSATE SODIUM 1 TABLET: 8.6; 5 TABLET ORAL at 09:04

## 2020-04-09 NOTE — SUBJECTIVE & OBJECTIVE
Interval History: No overnight events. Repeat covid-19 swab positive. No complaints. No further fever spikes.      Review of Systems   Constitutional: Negative for activity change, appetite change, chills, diaphoresis, fatigue and fever.   HENT: Negative for congestion, postnasal drip, rhinorrhea, sinus pressure, sinus pain and sneezing.    Respiratory: Negative for cough, chest tightness, shortness of breath, wheezing and stridor.    Cardiovascular: Negative for chest pain, palpitations and leg swelling.   Gastrointestinal: Negative for abdominal distention, abdominal pain, blood in stool, constipation, diarrhea and nausea.   Endocrine: Negative for cold intolerance and heat intolerance.   Genitourinary: Negative for dysuria, flank pain and hematuria.   Musculoskeletal: Negative for gait problem.   Neurological: Negative for dizziness and weakness.   Psychiatric/Behavioral: Negative for agitation and behavioral problems.     Objective:     Vital Signs (Most Recent):  Temp: 98.4 °F (36.9 °C) (04/09/20 1200)  Pulse: 61 (04/09/20 1200)  Resp: 20 (04/09/20 1200)  BP: 111/67 (04/09/20 1200)  SpO2: 95 % (04/09/20 1200) Vital Signs (24h Range):  Temp:  [98.4 °F (36.9 °C)-100 °F (37.8 °C)] 98.4 °F (36.9 °C)  Pulse:  [54-81] 61  Resp:  [18-22] 20  SpO2:  [88 %-100 %] 95 %  BP: (104-126)/(55-80) 111/67     Weight: 61 kg (134 lb 7.7 oz)  Body mass index is 26.26 kg/m².    Intake/Output Summary (Last 24 hours) at 4/9/2020 1505  Last data filed at 4/9/2020 0019  Gross per 24 hour   Intake 710 ml   Output 450 ml   Net 260 ml      Physical Exam   Constitutional: She appears well-developed and well-nourished.   HENT:   Head: Normocephalic and atraumatic.   Eyes: Pupils are equal, round, and reactive to light. EOM are normal.   Neck: Normal range of motion. Neck supple. No JVD present.   Cardiovascular: Normal rate, regular rhythm, normal heart sounds and intact distal pulses. Exam reveals no gallop and no friction rub.   No murmur  heard.  Pulmonary/Chest: Effort normal and breath sounds normal. No stridor. No respiratory distress. She has no rales.   Abdominal: Soft. Bowel sounds are normal. She exhibits no distension. There is no tenderness.   Musculoskeletal: Normal range of motion. She exhibits no edema or tenderness.   Neurological: She is alert. No cranial nerve deficit.   Skin: Skin is warm and dry.   Psychiatric: She has a normal mood and affect. Her behavior is normal.       Significant Labs: All pertinent labs within the past 24 hours have been reviewed.    Significant Imaging: I have reviewed all pertinent imaging results/findings within the past 24 hours.

## 2020-04-09 NOTE — PROGRESS NOTES
[facility] Hospital Medicine  Progress Note    Patient Name: Estefany Gonzalez  MRN: [id number]  Patient Class: IP- Inpatient   Admission Date: 4/5/2020  Length of Stay: 3 days  Attending Physician: Sebastien Freeman MD  Primary Care Provider: Primary Doctor No        Subjective:     Principal Problem:UTI (urinary tract infection)        HPI:  Pt is a 63 yo NH resident with dementia who presents for cough and fever.  Pt's roommate is in the ER too with milder symptoms.  In the ER pt was noted to have hypoxia and a UTI.  Pt had COVID testing done which was positive.  Pt unable to contriubte much to history.  In the ER pt was started on plaquenil and azithromycin for the COVID respiratory disease, and ceftriaxone for the UTI.      Overview/Hospital Course:  No notes on file    Interval History: No overnight events. Repeat covid-19 swab positive. No complaints. No further fever spikes.      Review of Systems   Constitutional: Negative for activity change, appetite change, chills, diaphoresis, fatigue and fever.   HENT: Negative for congestion, postnasal drip, rhinorrhea, sinus pressure, sinus pain and sneezing.    Respiratory: Negative for cough, chest tightness, shortness of breath, wheezing and stridor.    Cardiovascular: Negative for chest pain, palpitations and leg swelling.   Gastrointestinal: Negative for abdominal distention, abdominal pain, blood in stool, constipation, diarrhea and nausea.   Endocrine: Negative for cold intolerance and heat intolerance.   Genitourinary: Negative for dysuria, flank pain and hematuria.   Musculoskeletal: Negative for gait problem.   Neurological: Negative for dizziness and weakness.   Psychiatric/Behavioral: Negative for agitation and behavioral problems.     Objective:     Vital Signs (Most Recent):  Temp: 98.4 °F (36.9 °C) (04/09/20 1200)  Pulse: 61 (04/09/20 1200)  Resp: 20 (04/09/20 1200)  BP: 111/67 (04/09/20 1200)  SpO2: 95 % (04/09/20 1200) Vital Signs  "(24h Range):  Temp:  [98.4 °F (36.9 °C)-100 °F (37.8 °C)] 98.4 °F (36.9 °C)  Pulse:  [54-81] 61  Resp:  [18-22] 20  SpO2:  [88 %-100 %] 95 %  BP: (104-126)/(55-80) 111/67     Weight: 61 kg (134 lb 7.7 oz)  Body mass index is 26.26 kg/m².    Intake/Output Summary (Last 24 hours) at 4/9/2020 1505  Last data filed at 4/9/2020 0019  Gross per 24 hour   Intake 710 ml   Output 450 ml   Net 260 ml      Physical Exam   Constitutional: She appears well-developed and well-nourished.   HENT:   Head: Normocephalic and atraumatic.   Eyes: Pupils are equal, round, and reactive to light. EOM are normal.   Neck: Normal range of motion. Neck supple. No JVD present.   Cardiovascular: Normal rate, regular rhythm, normal heart sounds and intact distal pulses. Exam reveals no gallop and no friction rub.   No murmur heard.  Pulmonary/Chest: Effort normal and breath sounds normal. No stridor. No respiratory distress. She has no rales.   Abdominal: Soft. Bowel sounds are normal. She exhibits no distension. There is no tenderness.   Musculoskeletal: Normal range of motion. She exhibits no edema or tenderness.   Neurological: She is alert. No cranial nerve deficit.   Skin: Skin is warm and dry.   Psychiatric: She has a normal mood and affect. Her behavior is normal.       Significant Labs: All pertinent labs within the past 24 hours have been reviewed.    Significant Imaging: I have reviewed all pertinent imaging results/findings within the past 24 hours.      Assessment/Plan:      * UTI (urinary tract infection)  Rocephin  Urine cx growing proteus and providencia - both sensitive to ceftriaxone        Abnormal CT scan  CT report: "The lungs demonstrate chronic and atelectatic change mild infiltrates are noted in there is peribronchial thickening noted as discussed above.  The main pulmonary artery appears enlarged, correlation for pulmonary hypertension is needed.  Staghorn calculus of the left kidney with appearance of mild perinephric and " "periureteral stranding without obstructive uropathy which clinical and historical correlation is needed. Hypodense thyroid lesion on the right."    CHF (congestive heart failure)  Per chart review.  Monitor.       HTN (hypertension)  IV hydralazine PRN      DM2 (diabetes mellitus, type 2)        Dementia  Chronic, monitor      COPD (chronic obstructive pulmonary disease)  Spiriva, albuterol HFA prn      Nursing home resident  Pt's roommate is sick as well and admitted to the hospital.      COVID-19  - Isolation:   - Airborne, Contact and Droplet Precautions  - Cohort patients into COVID units  - N95 masks must be fit tested, wear eye protection  - 20 second hand hygiene              - Limit visitors per hospital policy              - Consolidating lab draws, nursing care, provider visits, and interventions    - Diagnostics: (leukopenia, hyponatremia, hyperferritinemia, elevated troponin, elevated d-dimer, age, and comorbidities are significant predictors of poor clinical outcome)  CBC, CMP, Procalcitonin, Ferritin, LDH and Portable CXR    - Management:  Supplemental O2 to maintain SpO2 >92%  Continuous/intermittent Pulse Ox  Albuterol INHALER PRN (avoid nebulization of secretions)  Avoiding BiPAP to prevent aerosolization (including home BiPAP)     Azithromycin + plaquenil completed  covid resulted negative  Repeat swab positive  Cont isolation  Still requires supp o2, although improved  Has qualified for home o2                  VTE Risk Mitigation (From admission, onward)         Ordered     enoxaparin injection 40 mg  Daily      04/06/20 0408     IP VTE LOW RISK PATIENT  Once      04/06/20 0347     Place JUAN hose  Until discontinued      04/06/20 0347     Place sequential compression device  Until discontinued      04/06/20 0347                      Sebastien Freeman MD  Department of Hospital Medicine   [facility] [place]    "

## 2020-04-09 NOTE — ASSESSMENT & PLAN NOTE
- Isolation:   - Airborne, Contact and Droplet Precautions  - Cohort patients into COVID units  - N95 masks must be fit tested, wear eye protection  - 20 second hand hygiene              - Limit visitors per hospital policy              - Consolidating lab draws, nursing care, provider visits, and interventions    - Diagnostics: (leukopenia, hyponatremia, hyperferritinemia, elevated troponin, elevated d-dimer, age, and comorbidities are significant predictors of poor clinical outcome)  CBC, CMP, Procalcitonin, Ferritin, LDH and Portable CXR    - Management:  Supplemental O2 to maintain SpO2 >92%  Continuous/intermittent Pulse Ox  Albuterol INHALER PRN (avoid nebulization of secretions)  Avoiding BiPAP to prevent aerosolization (including home BiPAP)     Azithromycin + plaquenil completed  covid resulted negative  Repeat swab positive  Cont isolation  Still requires supp o2, although improved  Has qualified for home o2

## 2020-04-09 NOTE — PLAN OF CARE
Pt remains confused throughout evening.  Pt has no complaints of pain.    Pt SR on telemetry.  Pt PM, pt found to have O2 off.  Pt sating 88% on RA.  Nasal cannula applied, pt sating in high 90s.  In AM, pt titrated from 3 L NC to 2 L NC, sating 94%.    Pt voiding spontaneously after DC of byrd catheter.  Pt removing ostomy bag multiple times throughout evening, site cleansed and 1-piece reapplied.

## 2020-04-10 LAB
ANION GAP SERPL CALC-SCNC: 8 MMOL/L (ref 8–16)
BACTERIA BLD CULT: NORMAL
BASOPHILS # BLD AUTO: 0 K/UL (ref 0–0.2)
BASOPHILS NFR BLD: 0 % (ref 0–1.9)
BUN SERPL-MCNC: 18 MG/DL (ref 8–23)
CALCIUM SERPL-MCNC: 8.2 MG/DL (ref 8.7–10.5)
CHLORIDE SERPL-SCNC: 105 MMOL/L (ref 95–110)
CO2 SERPL-SCNC: 28 MMOL/L (ref 23–29)
CREAT SERPL-MCNC: 0.7 MG/DL (ref 0.5–1.4)
DIFFERENTIAL METHOD: ABNORMAL
EOSINOPHIL # BLD AUTO: 0 K/UL (ref 0–0.5)
EOSINOPHIL NFR BLD: 0.9 % (ref 0–8)
ERYTHROCYTE [DISTWIDTH] IN BLOOD BY AUTOMATED COUNT: 14.1 % (ref 11.5–14.5)
EST. GFR  (AFRICAN AMERICAN): >60 ML/MIN/1.73 M^2
EST. GFR  (NON AFRICAN AMERICAN): >60 ML/MIN/1.73 M^2
GLUCOSE SERPL-MCNC: 74 MG/DL (ref 70–110)
HCT VFR BLD AUTO: 37.7 % (ref 37–48.5)
HGB BLD-MCNC: 11.3 G/DL (ref 12–16)
IMM GRANULOCYTES # BLD AUTO: 0.01 K/UL (ref 0–0.04)
IMM GRANULOCYTES NFR BLD AUTO: 0.3 % (ref 0–0.5)
LYMPHOCYTES # BLD AUTO: 1.3 K/UL (ref 1–4.8)
LYMPHOCYTES NFR BLD: 39.3 % (ref 18–48)
MCH RBC QN AUTO: 26.2 PG (ref 27–31)
MCHC RBC AUTO-ENTMCNC: 30 G/DL (ref 32–36)
MCV RBC AUTO: 88 FL (ref 82–98)
MONOCYTES # BLD AUTO: 0.4 K/UL (ref 0.3–1)
MONOCYTES NFR BLD: 13.3 % (ref 4–15)
NEUTROPHILS # BLD AUTO: 1.5 K/UL (ref 1.8–7.7)
NEUTROPHILS NFR BLD: 46.2 % (ref 38–73)
NRBC BLD-RTO: 0 /100 WBC
PLATELET # BLD AUTO: 133 K/UL (ref 150–350)
PMV BLD AUTO: 10.8 FL (ref 9.2–12.9)
POCT GLUCOSE: 161 MG/DL (ref 70–110)
POCT GLUCOSE: 71 MG/DL (ref 70–110)
POCT GLUCOSE: 72 MG/DL (ref 70–110)
POTASSIUM SERPL-SCNC: 4.1 MMOL/L (ref 3.5–5.1)
RBC # BLD AUTO: 4.31 M/UL (ref 4–5.4)
SODIUM SERPL-SCNC: 141 MMOL/L (ref 136–145)
WBC # BLD AUTO: 3.31 K/UL (ref 3.9–12.7)

## 2020-04-10 PROCEDURE — 25000003 PHARM REV CODE 250: Performed by: FAMILY MEDICINE

## 2020-04-10 PROCEDURE — 27000221 HC OXYGEN, UP TO 24 HOURS

## 2020-04-10 PROCEDURE — 63600175 PHARM REV CODE 636 W HCPCS: Performed by: FAMILY MEDICINE

## 2020-04-10 PROCEDURE — 99900035 HC TECH TIME PER 15 MIN (STAT)

## 2020-04-10 PROCEDURE — 36415 COLL VENOUS BLD VENIPUNCTURE: CPT

## 2020-04-10 PROCEDURE — 85025 COMPLETE CBC W/AUTO DIFF WBC: CPT

## 2020-04-10 PROCEDURE — 80048 BASIC METABOLIC PNL TOTAL CA: CPT

## 2020-04-10 PROCEDURE — 20000000 HC ICU ROOM

## 2020-04-10 RX ADMIN — FAMOTIDINE 20 MG: 20 TABLET ORAL at 08:04

## 2020-04-10 RX ADMIN — SENNOSIDES AND DOCUSATE SODIUM 1 TABLET: 8.6; 5 TABLET ORAL at 08:04

## 2020-04-10 RX ADMIN — TIOTROPIUM BROMIDE 18 MCG: 18 CAPSULE ORAL; RESPIRATORY (INHALATION) at 08:04

## 2020-04-10 RX ADMIN — CEFTRIAXONE 1 G: 1 INJECTION, SOLUTION INTRAVENOUS at 12:04

## 2020-04-10 RX ADMIN — ENOXAPARIN SODIUM 40 MG: 100 INJECTION SUBCUTANEOUS at 08:04

## 2020-04-10 NOTE — NURSING
Received patient from telemetry to room via bed. Patient accompanied by transport. Transferred patient to bed. Evaluated general patient appearance and condition. Admit assessment initiated. Saline lock at left lower forearm and is intact. Patient's eyes closed and resting quietly on 2 liters of oxygen via nasal cannula. No apparent distress noted at this time.

## 2020-04-10 NOTE — SUBJECTIVE & OBJECTIVE
Interval History: No overnight events. No complaints.       Review of Systems   Constitutional: Negative for activity change, appetite change, chills, diaphoresis, fatigue and fever.   HENT: Negative for congestion, postnasal drip, rhinorrhea, sinus pressure, sinus pain and sneezing.    Respiratory: Negative for cough, chest tightness, shortness of breath, wheezing and stridor.    Cardiovascular: Negative for chest pain, palpitations and leg swelling.   Gastrointestinal: Negative for abdominal distention, abdominal pain, blood in stool, constipation, diarrhea and nausea.   Endocrine: Negative for cold intolerance and heat intolerance.   Genitourinary: Negative for dysuria, flank pain and hematuria.   Musculoskeletal: Negative for gait problem.   Neurological: Negative for dizziness and weakness.   Psychiatric/Behavioral: Negative for agitation and behavioral problems.     Objective:     Vital Signs (Most Recent):  Temp: 98.7 °F (37.1 °C) (04/10/20 0300)  Pulse: (!) 56 (04/10/20 0900)  Resp: 20 (04/10/20 0857)  BP: 119/71 (04/10/20 0900)  SpO2: (!) 88 % (04/10/20 0900) Vital Signs (24h Range):  Temp:  [98.3 °F (36.8 °C)-99.6 °F (37.6 °C)] 98.7 °F (37.1 °C)  Pulse:  [56-71] 56  Resp:  [20] 20  SpO2:  [88 %-98 %] 88 %  BP: (111-136)/(67-86) 119/71     Weight: 61 kg (134 lb 7.7 oz)  Body mass index is 26.26 kg/m².    Intake/Output Summary (Last 24 hours) at 4/10/2020 1154  Last data filed at 4/10/2020 0022  Gross per 24 hour   Intake 560 ml   Output --   Net 560 ml      Physical Exam   Constitutional: She appears well-developed and well-nourished.   HENT:   Head: Normocephalic and atraumatic.   Eyes: Pupils are equal, round, and reactive to light. EOM are normal.   Neck: Normal range of motion. Neck supple. No JVD present.   Cardiovascular: Normal rate, regular rhythm, normal heart sounds and intact distal pulses. Exam reveals no gallop and no friction rub.   No murmur heard.  Pulmonary/Chest: Effort normal and breath  sounds normal. No stridor. No respiratory distress. She has no rales.   Abdominal: Soft. Bowel sounds are normal. She exhibits no distension. There is no tenderness.   Musculoskeletal: Normal range of motion. She exhibits no edema or tenderness.   Neurological: She is alert. No cranial nerve deficit.   Skin: Skin is warm and dry.   Psychiatric: She has a normal mood and affect. Her behavior is normal.       Significant Labs: All pertinent labs within the past 24 hours have been reviewed.    Significant Imaging: I have reviewed all pertinent imaging results/findings within the past 24 hours.

## 2020-04-10 NOTE — ASSESSMENT & PLAN NOTE
- Isolation:   - Airborne, Contact and Droplet Precautions  - Cohort patients into COVID units  - N95 masks must be fit tested, wear eye protection  - 20 second hand hygiene              - Limit visitors per hospital policy              - Consolidating lab draws, nursing care, provider visits, and interventions    - Diagnostics: (leukopenia, hyponatremia, hyperferritinemia, elevated troponin, elevated d-dimer, age, and comorbidities are significant predictors of poor clinical outcome)  CBC, CMP, Procalcitonin, Ferritin, LDH and Portable CXR    - Management:  Supplemental O2 to maintain SpO2 >92%  Continuous/intermittent Pulse Ox  Albuterol INHALER PRN (avoid nebulization of secretions)  Avoiding BiPAP to prevent aerosolization (including home BiPAP)     Azithromycin + plaquenil completed  covid resulted negative  Repeat swab positive  Cont isolation  Still requires supp o2, although improved  Has qualified for home o2  Pending discharge. May meet criteria for [facility].

## 2020-04-10 NOTE — ASSESSMENT & PLAN NOTE
Urine cx growing proteus and providencia - both sensitive to ceftriaxone  Complete antibiotics today

## 2020-04-10 NOTE — PROGRESS NOTES
[facility] Hospital Medicine  Progress Note    Patient Name: Estefany Gonzalez  MRN: [id number]  Patient Class: IP- Inpatient   Admission Date: 4/5/2020  Length of Stay: 4 days  Attending Physician: Sebastien Freeman MD  Primary Care Provider: Primary Doctor No        Subjective:     Principal Problem:UTI (urinary tract infection)        HPI:  Pt is a 63 yo NH resident with dementia who presents for cough and fever.  Pt's roommate is in the ER too with milder symptoms.  In the ER pt was noted to have hypoxia and a UTI.  Pt had COVID testing done which was positive.  Pt unable to contriubte much to history.  In the ER pt was started on plaquenil and azithromycin for the COVID respiratory disease, and ceftriaxone for the UTI.      Overview/Hospital Course:  No notes on file    Interval History: No overnight events. No complaints.       Review of Systems   Constitutional: Negative for activity change, appetite change, chills, diaphoresis, fatigue and fever.   HENT: Negative for congestion, postnasal drip, rhinorrhea, sinus pressure, sinus pain and sneezing.    Respiratory: Negative for cough, chest tightness, shortness of breath, wheezing and stridor.    Cardiovascular: Negative for chest pain, palpitations and leg swelling.   Gastrointestinal: Negative for abdominal distention, abdominal pain, blood in stool, constipation, diarrhea and nausea.   Endocrine: Negative for cold intolerance and heat intolerance.   Genitourinary: Negative for dysuria, flank pain and hematuria.   Musculoskeletal: Negative for gait problem.   Neurological: Negative for dizziness and weakness.   Psychiatric/Behavioral: Negative for agitation and behavioral problems.     Objective:     Vital Signs (Most Recent):  Temp: 98.7 °F (37.1 °C) (04/10/20 0300)  Pulse: (!) 56 (04/10/20 0900)  Resp: 20 (04/10/20 0857)  BP: 119/71 (04/10/20 0900)  SpO2: (!) 88 % (04/10/20 0900) Vital Signs (24h Range):  Temp:  [98.3 °F (36.8 °C)-99.6 °F  "(37.6 °C)] 98.7 °F (37.1 °C)  Pulse:  [56-71] 56  Resp:  [20] 20  SpO2:  [88 %-98 %] 88 %  BP: (111-136)/(67-86) 119/71     Weight: 61 kg (134 lb 7.7 oz)  Body mass index is 26.26 kg/m².    Intake/Output Summary (Last 24 hours) at 4/10/2020 1154  Last data filed at 4/10/2020 0022  Gross per 24 hour   Intake 560 ml   Output --   Net 560 ml      Physical Exam   Constitutional: She appears well-developed and well-nourished.   HENT:   Head: Normocephalic and atraumatic.   Eyes: Pupils are equal, round, and reactive to light. EOM are normal.   Neck: Normal range of motion. Neck supple. No JVD present.   Cardiovascular: Normal rate, regular rhythm, normal heart sounds and intact distal pulses. Exam reveals no gallop and no friction rub.   No murmur heard.  Pulmonary/Chest: Effort normal and breath sounds normal. No stridor. No respiratory distress. She has no rales.   Abdominal: Soft. Bowel sounds are normal. She exhibits no distension. There is no tenderness.   Musculoskeletal: Normal range of motion. She exhibits no edema or tenderness.   Neurological: She is alert. No cranial nerve deficit.   Skin: Skin is warm and dry.   Psychiatric: She has a normal mood and affect. Her behavior is normal.       Significant Labs: All pertinent labs within the past 24 hours have been reviewed.    Significant Imaging: I have reviewed all pertinent imaging results/findings within the past 24 hours.      Assessment/Plan:      * UTI (urinary tract infection)  Urine cx growing proteus and providencia - both sensitive to ceftriaxone  Complete antibiotics today        Abnormal CT scan  CT report: "The lungs demonstrate chronic and atelectatic change mild infiltrates are noted in there is peribronchial thickening noted as discussed above.  The main pulmonary artery appears enlarged, correlation for pulmonary hypertension is needed.  Staghorn calculus of the left kidney with appearance of mild perinephric and periureteral stranding without " "obstructive uropathy which clinical and historical correlation is needed. Hypodense thyroid lesion on the right."    CHF (congestive heart failure)  Per chart review.  Monitor.       HTN (hypertension)  IV hydralazine PRN      DM2 (diabetes mellitus, type 2)        Dementia  Chronic, monitor      COPD (chronic obstructive pulmonary disease)  Spiriva, albuterol HFA prn      Nursing home resident  Pt's roommate is sick as well and admitted to the hospital.      COVID-19  - Isolation:   - Airborne, Contact and Droplet Precautions  - Cohort patients into COVID units  - N95 masks must be fit tested, wear eye protection  - 20 second hand hygiene              - Limit visitors per hospital policy              - Consolidating lab draws, nursing care, provider visits, and interventions    - Diagnostics: (leukopenia, hyponatremia, hyperferritinemia, elevated troponin, elevated d-dimer, age, and comorbidities are significant predictors of poor clinical outcome)  CBC, CMP, Procalcitonin, Ferritin, LDH and Portable CXR    - Management:  Supplemental O2 to maintain SpO2 >92%  Continuous/intermittent Pulse Ox  Albuterol INHALER PRN (avoid nebulization of secretions)  Avoiding BiPAP to prevent aerosolization (including home BiPAP)     Azithromycin + plaquenil completed  covid resulted negative  Repeat swab positive  Cont isolation  Still requires supp o2, although improved  Has qualified for home o2  Pending discharge. May meet criteria for [facility] center.                  VTE Risk Mitigation (From admission, onward)         Ordered     enoxaparin injection 40 mg  Daily      04/06/20 0408     IP VTE LOW RISK PATIENT  Once      04/06/20 0347     Place JUAN hose  Until discontinued      04/06/20 0347     Place sequential compression device  Until discontinued      04/06/20 0347                      Sebastien Freeman MD  Department of Hospital Medicine   [facility]    "

## 2020-04-11 VITALS
DIASTOLIC BLOOD PRESSURE: 67 MMHG | OXYGEN SATURATION: 95 % | BODY MASS INDEX: 26.41 KG/M2 | TEMPERATURE: 98 F | WEIGHT: 134.5 LBS | HEART RATE: 72 BPM | SYSTOLIC BLOOD PRESSURE: 106 MMHG | HEIGHT: 60 IN | RESPIRATION RATE: 18 BRPM

## 2020-04-11 PROBLEM — N39.0 UTI (URINARY TRACT INFECTION): Status: RESOLVED | Noted: 2020-04-06 | Resolved: 2020-04-11

## 2020-04-11 LAB
ANION GAP SERPL CALC-SCNC: 10 MMOL/L (ref 8–16)
BASOPHILS # BLD AUTO: 0 K/UL (ref 0–0.2)
BASOPHILS NFR BLD: 0 % (ref 0–1.9)
BUN SERPL-MCNC: 21 MG/DL (ref 8–23)
CALCIUM SERPL-MCNC: 8.1 MG/DL (ref 8.7–10.5)
CHLORIDE SERPL-SCNC: 104 MMOL/L (ref 95–110)
CO2 SERPL-SCNC: 26 MMOL/L (ref 23–29)
CREAT SERPL-MCNC: 0.7 MG/DL (ref 0.5–1.4)
DIFFERENTIAL METHOD: ABNORMAL
EOSINOPHIL # BLD AUTO: 0 K/UL (ref 0–0.5)
EOSINOPHIL NFR BLD: 0.7 % (ref 0–8)
ERYTHROCYTE [DISTWIDTH] IN BLOOD BY AUTOMATED COUNT: 14 % (ref 11.5–14.5)
EST. GFR  (AFRICAN AMERICAN): >60 ML/MIN/1.73 M^2
EST. GFR  (NON AFRICAN AMERICAN): >60 ML/MIN/1.73 M^2
GLUCOSE SERPL-MCNC: 78 MG/DL (ref 70–110)
HCT VFR BLD AUTO: 38.3 % (ref 37–48.5)
HGB BLD-MCNC: 11.5 G/DL (ref 12–16)
IMM GRANULOCYTES # BLD AUTO: 0.02 K/UL (ref 0–0.04)
IMM GRANULOCYTES NFR BLD AUTO: 0.5 % (ref 0–0.5)
LYMPHOCYTES # BLD AUTO: 1.7 K/UL (ref 1–4.8)
LYMPHOCYTES NFR BLD: 40.7 % (ref 18–48)
MCH RBC QN AUTO: 26.3 PG (ref 27–31)
MCHC RBC AUTO-ENTMCNC: 30 G/DL (ref 32–36)
MCV RBC AUTO: 87 FL (ref 82–98)
MONOCYTES # BLD AUTO: 0.5 K/UL (ref 0.3–1)
MONOCYTES NFR BLD: 11.7 % (ref 4–15)
NEUTROPHILS # BLD AUTO: 1.9 K/UL (ref 1.8–7.7)
NEUTROPHILS NFR BLD: 46.4 % (ref 38–73)
NRBC BLD-RTO: 0 /100 WBC
PLATELET # BLD AUTO: 149 K/UL (ref 150–350)
PMV BLD AUTO: 10 FL (ref 9.2–12.9)
POCT GLUCOSE: 76 MG/DL (ref 70–110)
POTASSIUM SERPL-SCNC: 3.9 MMOL/L (ref 3.5–5.1)
RBC # BLD AUTO: 4.38 M/UL (ref 4–5.4)
SODIUM SERPL-SCNC: 140 MMOL/L (ref 136–145)
WBC # BLD AUTO: 4.1 K/UL (ref 3.9–12.7)

## 2020-04-11 PROCEDURE — 85025 COMPLETE CBC W/AUTO DIFF WBC: CPT

## 2020-04-11 PROCEDURE — 80048 BASIC METABOLIC PNL TOTAL CA: CPT

## 2020-04-11 PROCEDURE — 27000221 HC OXYGEN, UP TO 24 HOURS

## 2020-04-11 PROCEDURE — 36415 COLL VENOUS BLD VENIPUNCTURE: CPT

## 2020-04-11 PROCEDURE — 25000003 PHARM REV CODE 250: Performed by: FAMILY MEDICINE

## 2020-04-11 PROCEDURE — 94761 N-INVAS EAR/PLS OXIMETRY MLT: CPT

## 2020-04-11 RX ORDER — ESCITALOPRAM OXALATE 20 MG/1
20 TABLET ORAL DAILY
Qty: 14 TABLET | Refills: 0 | Status: SHIPPED | OUTPATIENT
Start: 2020-04-11 | End: 2020-04-25

## 2020-04-11 RX ORDER — OXYBUTYNIN CHLORIDE 10 MG/1
10 TABLET, EXTENDED RELEASE ORAL DAILY
Qty: 14 TABLET | Refills: 0 | Status: SHIPPED | OUTPATIENT
Start: 2020-04-11

## 2020-04-11 RX ORDER — FAMOTIDINE 40 MG/1
20 TABLET, FILM COATED ORAL 2 TIMES DAILY
Qty: 14 TABLET | Refills: 0 | Status: SHIPPED | OUTPATIENT
Start: 2020-04-11 | End: 2020-04-25

## 2020-04-11 RX ORDER — AMOXICILLIN 250 MG
1 CAPSULE ORAL 2 TIMES DAILY
Qty: 28 TABLET | Refills: 0 | Status: SHIPPED | OUTPATIENT
Start: 2020-04-11 | End: 2020-04-25

## 2020-04-11 RX ORDER — ALBUTEROL SULFATE 90 UG/1
2 AEROSOL, METERED RESPIRATORY (INHALATION) EVERY 6 HOURS PRN
Qty: 8 G | Refills: 0 | Status: SHIPPED | OUTPATIENT
Start: 2020-04-11 | End: 2020-05-06

## 2020-04-11 RX ORDER — FLUTICASONE PROPIONATE AND SALMETEROL 250; 50 UG/1; UG/1
1 POWDER RESPIRATORY (INHALATION) 2 TIMES DAILY
Qty: 60 EACH | Refills: 0 | Status: SHIPPED | OUTPATIENT
Start: 2020-04-11

## 2020-04-11 RX ORDER — FERROUS SULFATE 325(65) MG
325 TABLET ORAL DAILY
Qty: 14 TABLET | Refills: 0 | Status: SHIPPED | OUTPATIENT
Start: 2020-04-11 | End: 2020-04-25

## 2020-04-11 RX ADMIN — FAMOTIDINE 20 MG: 20 TABLET ORAL at 08:04

## 2020-04-11 RX ADMIN — TIOTROPIUM BROMIDE 18 MCG: 18 CAPSULE ORAL; RESPIRATORY (INHALATION) at 08:04

## 2020-04-11 NOTE — PROGRESS NOTES
[facility] have [facility] with [facility] information and have reserved patient transport in Will Call awaiting our call to send ambulance.      is en route to [facility] with patient's medication to bring to our pharmacy; will set up transport once medications are in-hand     Medication is safely at [facility]; contacted Carri to move patient to ready list for ;  estimated for within the hour. Contacted patient's nursePalak to inform of estimated discharge time.

## 2020-04-11 NOTE — NURSING
"0800: Patient awake and sitting up in bed. Note that patients eyes are red and weepy. Intermittent congested cough. Patient continues to ask "can I get some coffee" at all hours of the day and night. I initially met patient in the ED on 4/7/20 when she first came in via ambulance from the nursing home and she was asking this incessantly and to anyone who came near her, despite being told several times that she could not have coffee until seen by a doctor (at the time). That pattern of behavior continues now whether or not she is actually given coffee. Hx of dementia. Patient covered in feces and whole pieces of food from colostomy bag in bed. Patient has removed her colostomy bag again (reported overnight that she did this more than once). Patient does not notice that she is sitting in stool and does not participate in ostomy care.  She is able to follow commands when prompted but has delayed responses.     Patient cleaned, new pouch applied, and bed linens changed. I fed patient to be sure she was eating and drinking well, which she was. Patient ate 50% of breakfast.     Took PO medications without issue. Held ducosate pill because patient having lots of regular stool from colostomy.     1030: ok'd to leave patient without IV as she is not receiving any IV medications. Patient pulled of one of the foam borders I placed on her left arm over skin tears/former IV site. Left MINOR as patient seems to not be in discomfort from tears and they are dry in appearance. Patient dozing off.    1040: received phone call from Eliot, to call report at [facility]: [phone] and ask for Terrance.     1050: MINOO Leon, attempted to sit patient on side of bed to assess strength. Patient not maintaining sitting position as she is too sleepy from nap. Patient allowed to rest as she did not sleep much yesterday or last night.    1100: report called to Terrance who was updated and made aware patient was COVID positive. " Patient will have to leave by stretcher as she cannot stand at this time due to generalized weakness.     1118: Terrance from [facility] called back to let us know that they cannot take COVID positive patients at NH. Called to updated , Eliot.     1200: patient resting comfortably during rounds. Rouses easily to voice then falls right back asleep. 98% on 2L O2 via NC. Will give lunch when patient awake.     1300: pt asleep. Ostomy pouch intact.    1400: patient asleep. Ostomy pouch intact.     1500: patient asleep. Packet dropped off per Case Management, Eliot, for patient's transfer to [facility]. Waiting on medications to arrive in pharmacy before Eliot will call transport to collect patient for transfer.   Ostomy bag edges reinforced with medipore tape (note patient sweats in sleep and so adhesive loosens, but pouch remains intact and whole). Little stool in ostomy pouch. No gas trapped. Patient remains asleep after diaper was changed and patient turned to right side. Resting comfortably without any skin breakdown noted.     1520: pharmacy checking meds delivered from [facility] - will deliver to PACU once complete. Update given to , Eliot.    1542: Eliot called back and let me know that [facility] will be arriving within the hour. Patient did not eat lunch as she has been resting comfortably. Will offer patient food if she wakes up prior to transport to [facility].

## 2020-04-11 NOTE — PROGRESS NOTES
Contacted Terrance at [facility] who indicated that patient's nurse can call report for return to NH; fax number provided for record review; faxed patient's packet to Terrance and through [employer]; provided nurseToya contact information in PACU to call report    Patient's nurse also checked with Terrance during call report to determine if patient is able to return being Covid positive; Terrance is making sure and will let us know; patient will need EMS transport due to weakness; unable to sit up

## 2020-04-11 NOTE — PROGRESS NOTES
Toya, patient's nurse called and indicated that patient will not be able to return to NH; Terrance, nurse at [facility] met with admissions staff who indicated that patient will not be able to return due to COVID-19 + status.

## 2020-04-11 NOTE — DISCHARGE SUMMARY
[facility] Hospital Medicine  Discharge Summary      Patient Name: Estefany Gonzalez  MRN: [id number]  Admission Date: 4/5/2020  Hospital Length of Stay: 5 days  Discharge Date and Time:  04/11/2020 1:58 PM  Attending Physician: Sebastine Freeman MD   Discharging Provider: Sebastien Freeman MD  Primary Care Provider: Primary Doctor No      HPI:   Pt is a 63 yo NH resident with dementia who presents for cough and fever.  Pt's roommate is in the ER too with milder symptoms.  In the ER pt was noted to have hypoxia and a UTI.  Pt had COVID testing done which was positive.  Pt unable to contriubte much to history.  In the ER pt was started on plaquenil and azithromycin for the COVID respiratory disease, and ceftriaxone for the UTI.      * No surgery found *      Hospital Course:   Patient admitted for acute hypoxic respiratory failure due to suspected covid-19. Patient also noted to have UTI. She was started on azithromycin, ceftriaxone, and plaquenil. First covid-19 swab was negative, however repeat swab positive. Patient placed on supplemental oxygen with nasal cannula which she tolerated well. Patient completed treatment with antibiotics and plaquenil with improvement in condition. She had persistent mild hypoxia and qualified for home oxygen 2L nasal cannula. Patient has remained stable and is cleared for discharge.      Consults:     No new Assessment & Plan notes have been filed under this hospital service since the last note was generated.  Service: Hospital Medicine    Final Active Diagnoses:    Diagnosis Date Noted POA    COVID-19 [U07.1, J98.8] 04/06/2020 Yes    Nursing home resident [Z59.3] 04/06/2020 Not Applicable    COPD (chronic obstructive pulmonary disease) [J44.9] 04/06/2020 Unknown    Dementia [F03.90] 04/06/2020 Unknown    DM2 (diabetes mellitus, type 2) [E11.9] 04/06/2020 Unknown    HTN (hypertension) [I10] 04/06/2020 Unknown    CHF (congestive heart failure) [I50.9] 04/06/2020  Unknown    Abnormal CT scan [R93.89] 04/06/2020 Unknown      Problems Resolved During this Admission:    Diagnosis Date Noted Date Resolved POA    PRINCIPAL PROBLEM:  UTI (urinary tract infection) [N39.0] 04/06/2020 04/11/2020 Unknown       Discharged Condition: good    Disposition: Home or Self Care    Follow Up:  Follow-up Information     [facility] On 4/6/2020.    Why:  Nursing Home  Contact information:  [address] [place] [id number] [phone]               Patient Instructions:      OXYGEN FOR HOME USE     Order Specific Question Answer Comments   Liter Flow 2    Duration Continuous    Qualifying SpO2: 85%    Testing done at: Rest    Route nasal cannula    Portable mode: continuous    Device home concentrator    Length of need (in months): 12 mos    Patient condition with qualifying saturation COPD Pulm HTN, covid-19   Height: 5' (1.524 m)    Weight: 61 kg (134 lb 7.7 oz)    Does patient have medical equipment at home? other (see comments) dme at NH   Alternative treatment measures have been tried or considered and deemed clinically ineffective. Yes      Diet Adult Regular     Notify your health care provider if you experience any of the following:  temperature >100.4     Notify your health care provider if you experience any of the following:  persistent nausea and vomiting or diarrhea     Notify your health care provider if you experience any of the following:  difficulty breathing or increased cough     Notify your health care provider if you experience any of the following:  persistent dizziness, light-headedness, or visual disturbances     Notify your health care provider if you experience any of the following:  increased confusion or weakness     Activity as tolerated       Significant Diagnostic Studies: Labs: All labs within the past 24 hours have been reviewed    Pending Diagnostic Studies:     None         Medications:  Reconciled Home Medications:      Medication List       START taking these medications    famotidine 40 MG tablet  Commonly known as:  PEPCID  Take 0.5 tablets (20 mg total) by mouth 2 (two) times daily. for 14 days     ferrous sulfate 325 mg (65 mg iron) Tab tablet  Commonly known as:  FEOSOL  Take 1 tablet (325 mg total) by mouth once daily. for 14 days  Replaces:  ferrous sulfate 325 (65 FE) MG EC tablet     SENNA PLUS 8.6-50 mg per tablet  Generic drug:  senna-docusate 8.6-50 mg  Take 1 tablet by mouth 2 (two) times daily. for 14 days        CHANGE how you take these medications    * albuterol sulfate 2.5 mg/0.5 mL Nebu  Take 2.5 mg by nebulization 4 (four) times daily.  What changed:  Another medication with the same name was added. Make sure you understand how and when to take each.     * PROAIR HFA 90 mcg/actuation inhaler  Generic drug:  albuterol  Inhale 2 puffs into the lungs every 6 (six) hours as needed for Wheezing. Rescue  What changed:  You were already taking a medication with the same name, and this prescription was added. Make sure you understand how and when to take each.         * This list has 2 medication(s) that are the same as other medications prescribed for you. Read the directions carefully, and ask your doctor or other care provider to review them with you.            CONTINUE taking these medications    escitalopram oxalate 20 MG tablet  Commonly known as:  LEXAPRO  Take 1 tablet (20 mg total) by mouth once daily. for 14 days     oxybutynin 10 MG 24 hr tablet  Commonly known as:  DITROPAN-XL  Take 1 tablet (10 mg total) by mouth once daily.     WIXELA INHUB 250-50 mcg/dose diskus inhaler  Generic drug:  fluticasone-salmeterol 250-50 mcg/dose  Inhale 1 puff into the lungs 2 (two) times daily. Controller        STOP taking these medications    BACTRIM -160 mg Tab  Generic drug:  sulfamethoxazole-trimethoprim 800-160mg     budesonide 0.5 mg/2 mL nebulizer solution  Commonly known as:  PULMICORT     ferrous sulfate 325 (65 FE) MG EC  tablet  Replaced by:  ferrous sulfate 325 mg (65 mg iron) Tab tablet     tolterodine 2 MG Cp24  Commonly known as:  DETROL LA            Indwelling Lines/Drains at time of discharge:   Lines/Drains/Airways     Drain                 Colostomy 04/06/20 0646 LLQ 5 days                Time spent on the discharge of patient: 34 minutes  Patient was seen and examined on the date of discharge and determined to be suitable for discharge.         Sebastien Freeman MD  Department of Hospital Medicine  [facility]

## 2020-04-11 NOTE — HOSPITAL COURSE
Patient admitted for acute hypoxic respiratory failure due to suspected covid-19. Patient also noted to have UTI. She was started on azithromycin, ceftriaxone, and plaquenil. First covid-19 swab was negative, however repeat swab positive. Patient placed on supplemental oxygen with nasal cannula which she tolerated well. Patient completed treatment with antibiotics and plaquenil with improvement in condition. She had persistent mild hypoxia and qualified for home oxygen 2L nasal cannula. Patient has remained stable and is cleared for discharge.

## 2020-04-11 NOTE — NURSING
Patient left with [facility] ambulance service. Woke up fully during transfer. Informed patient of where she was to go but patient did not indicate any understanding. Coughing more once awake and refusing to keep on mask during transport in hallway. Went to pharmacy with EMS to get meds for transfer. Ostomy supplies and diaper also sent with patient.

## 2020-04-11 NOTE — PLAN OF CARE
04/11/20 1303   Medicare Message   Important Message from Medicare regarding Discharge Appeal Rights Given to patient/caregiver;Explained to patient/caregiver;Other (comments)  (Orally explained through nurse; no phone in the room; mobile phone not working; unable to contact family)   Date IMM was signed 04/11/20   Time IMM was signed [id number]

## 2020-04-11 NOTE — PLAN OF CARE
04/11/20 1309   Post-Acute Status   Post-Acute Authorization Other  (Patient to discharge to  [facility]; could not return to [facility])   Other Status No Post-Acute Service Needs   Discharge Delays None known at this time   Discharge Plan   Discharge Plan A Other  ([facility] then [facility])   Discharge Plan B Other  ([facility] then [facility])

## 2020-04-11 NOTE — PLAN OF CARE
04/11/20 1311   Final Note   Assessment Type Final Discharge Note   Anticipated Discharge Disposition Int Care Fac  ([facility] then return to [facility])   What phone number can be called within the next 1-3 days to see how you are doing after discharge?   ([phone])   Hospital Follow Up  Appt(s) scheduled? No   Discharge plans and expectations educations in teach back method with documentation complete? Yes   Right Care Referral Info   Post Acute Recommendation Other   Referral Type [facility] then Nursing Home-return   Facility Name [facility]; [address] [facility], [place]; [place]   Post-Acute Status   Post-Acute Authorization Placement  ([facility]; [facility])   Post-Acute Placement Status Set-up Complete   Other Status No Post-Acute Service Needs   Discharge Delays None known at this time

## 2020-04-11 NOTE — PROGRESS NOTES
Patient status information collected from patient's nurse:  No H/D needs  No wounds  No suctioning needs  Able to self-feed with setup  Low activity level and has mostly been bed-bound due to weakness  Patient does have colostomy     Contacted [facility] outpatient pharmacy to determine estimated time medications will be ready for pickup; Marco A with pharmacy indicated medications will be ready in 30 minutes. Prescriptions were covered at no charge.     Contacted  to set up taxi/courrier to  medications from outpatient pharmacy to bring to our pharmacy for patient's discharge. Informed Macarena in pharmacy that a  will be delivering the medication.

## 2020-04-11 NOTE — PROGRESS NOTES
LCSW attempted to contact the patient and the patient's family to discuss and review the discharge to the [facility], since Esthela Mabry was unable to take the patient back this weekend. Reviewed IMM notice through nurse and provided discharge information through nurse.

## 2020-04-13 NOTE — PHYSICIAN QUERY
"PT Name: Estefany Gonzalez  MR #: [id number]     Physician Query Form - Documentation Clarification      CDS: Katerin Steele RN  Contact information: [email or web address]    This form is a permanent document in the medical record.     Query Date: April 13, 2020  By submitting this query, we are merely seeking further clarification of documentation. Please utilize your independent clinical judgment when addressing the question(s) below.    The Medical Record reflects the following:    Clinical Findings Location in Medical Record   No significant tachypnea or respiratory distress noted.      ED provider note 4/5   Wet productive cough noted with bilateral wheezing noted. Pt on 4 L / NC and sats remain between 95%-98%. When pt removes nasal cannula saturations drop to 80% or lower. Unable to maintain room air sats. ED nurses note 4/5 4/5/2020 22:27  POC PH: 7.346   POC PCO2: 51.0   POC PO2: 36   Sample: VENOUS  DelSys: Nasal Can Labs    There is persistent bulging at the AP window, which may indicate pulmonary arterial hypertension.  There is no focal consolidation, pneumothorax, or pleural effusion. CXR 4/5   CT report: "The lungs demonstrate chronic and atelectatic change mild infiltrates are noted in there is peribronchial thickening noted as discussed above. The main pulmonary artery appears enlarged, correlation for pulmonary hypertension is needed.    COPD (chronic obstructive pulmonary disease)  Spiriva, albuterol HFA prn   H&P 4/6   Initially admitted with concern of COVID 19 infection.  Slightly hypoxic on presentation.  COVID 19 negative.  Minimal oxygen demands.  Wean oxygen as possible    care update 4/6  MD Viet   Patient admitted for acute hypoxic respiratory failure due to suspected covid-19.   First covid-19 swab was negative, however repeat swab positive.  Patient placed on supplemental oxygen with nasal cannula which she tolerated well. Patient completed treatment with antibiotics and plaquenil " with improvement in condition. She had persistent mild hypoxia and qualified for home oxygen 2L nasal cannula.    DC summary 4/11  Hand, MD bowens.  Please clarify the diagnosis of acute hypoxic respiratory failure.    Provider Use Only  [  ] Diagnosis ruled in and additional clinical support/ decision making indicators for the diagnosis include (specify):  _______________    [  ]  Above stated diagnosis has been ruled out, other diagnosis ruled in:         [  ] Hypoxia only         [  ] Other: _____________________    [  ] Other clarification (specify): ______________    [  ] Clinically undetermined

## 2020-04-14 DIAGNOSIS — U07.1 COVID-19 VIRUS DETECTED: ICD-10-CM

## 2020-04-15 NOTE — PHYSICIAN QUERY
"PT Name: Estefany Gonzalez  MR #: [id number]     Physician Query Form - Documentation Clarification      CDS: Katerin Steele RN  Contact information: [email or web address]    This form is a permanent document in the medical record.     Query Date: April 15, 2020  By submitting this query, we are merely seeking further clarification of documentation. Please utilize your independent clinical judgment when addressing the question(s) below.    The Medical Record reflects the following:    Clinical Findings Location in Medical Record   No significant tachypnea or respiratory distress noted.      ED provider note 4/5   Wet productive cough noted with bilateral wheezing noted. Pt on 4 L / NC and sats remain between 95%-98%. When pt removes nasal cannula saturations drop to 80% or lower. Unable to maintain room air sats. ED nurses note 4/5 4/5/2020 22:27  POC PH: 7.346   POC PCO2: 51.0   POC PO2: 36   Sample: VENOUS  DelSys: Nasal Can Labs    There is persistent bulging at the AP window, which may indicate pulmonary arterial hypertension.  There is no focal consolidation, pneumothorax, or pleural effusion. CXR 4/5   CT report: "The lungs demonstrate chronic and atelectatic change mild infiltrates are noted in there is peribronchial thickening noted as discussed above. The main pulmonary artery appears enlarged, correlation for pulmonary hypertension is needed.    COPD (chronic obstructive pulmonary disease)  Spiriva, albuterol HFA prn   H&P 4/6   Initially admitted with concern of COVID 19 infection.  Slightly hypoxic on presentation.  COVID 19 negative.  Minimal oxygen demands.  Wean oxygen as possible    care update 4/6  MD Viet   Patient admitted for acute hypoxic respiratory failure due to suspected covid-19.   First covid-19 swab was negative, however repeat swab positive.  Patient placed on supplemental oxygen with nasal cannula which she tolerated well. Patient completed treatment with antibiotics and plaquenil " with improvement in condition. She had persistent mild hypoxia and qualified for home oxygen 2L nasal cannula.    DC summary 4/11  Hand, MD bowens.  Please clarify the diagnosis of acute hypoxic respiratory failure.    Provider Use Only  [  ] Diagnosis ruled in and additional clinical support/ decision making indicators for the diagnosis include (specify):  _______________    [ x ]  Above stated diagnosis has been ruled out, other diagnosis ruled in:         [ x ] Hypoxia only         [  ] Other: _____________________    [  ] Other clarification (specify): ______________    [  ] Clinically undetermined

## 2020-05-06 NOTE — ASSESSMENT & PLAN NOTE
Per chart review.  Unclear if correct, will check hemoglobin A1C prior to starting SSI as glucose levels <100.     normal...

## 2020-05-07 ENCOUNTER — TELEPHONE (OUTPATIENT)
Dept: ADMINISTRATIVE | Facility: HOSPITAL | Age: 65
End: 2020-05-07

## 2022-03-29 NOTE — ASSESSMENT & PLAN NOTE
"  CT report: "The lungs demonstrate chronic and atelectatic change mild infiltrates are noted in there is peribronchial thickening noted as discussed above.  The main pulmonary artery appears enlarged, correlation for pulmonary hypertension is needed.  Staghorn calculus of the left kidney with appearance of mild perinephric and periureteral stranding without obstructive uropathy which clinical and historical correlation is needed. Hypodense thyroid lesion on the right."  " "COPD Initial Interview, Education, and Consult  3/29/2022, 2:53 PM    Reason for Consult: COPD exacerbation  Patient Admitted for: COPD exacerbation (H) [J44.1]  Acute on chronic respiratory failure with hypoxia (H) [J96.21]  Pneumonia of right lower lobe due to infectious organism [J18.9]  Dyspnea, unspecified type [R06.00] on 3/26/2022     History of Present Illness: Patient with history of COPD, HLD, Afib, HTN who was admitted for COPD exacerbation. She has received treatment with Bipap, scheduled nebulizers, supplemental oxygen, antbiotics, and steroids.     Last PFT:  None on record    Home Respiratory Medications:  Bronchodilators:   -albuterol neb/inhaler PRN  Combination Therapy:   -Symbicort    Assessment: Patient currently napping but easily aroused, alert when awakened, and speaking in full sentences. /58 (BP Location: Right arm)   Pulse 65   Temp 98.2  F (36.8  C) (Oral)   Resp 20   Ht 1.651 m (5' 5\")   Wt 70.4 kg (155 lb 3.2 oz)   SpO2 92%   BMI 25.83 kg/m        Education done during visit:  Cecilia was not feeling up to having a discussion and receiving education at this time. She did accept a COPD information and resource packet from me and stated that she would read it at some point.     -Recommendations:  -Continue current inpatient therapy, per [facility] protocols  -Follow up appointment with pulmonology along with an update PFT/Spirometry test.    Cecilia will participate in our call back program. Will attempt to follow up with patient throughout hospital stay.    Total time spend with patient 3 minutes and 30 minutes spent in chart review, care coordination, and documentation.    Aamir Ross, RT, Chronic Pulmonary Disease Specialist  Phone [phone]  "